# Patient Record
Sex: MALE | Race: BLACK OR AFRICAN AMERICAN | Employment: OTHER | URBAN - METROPOLITAN AREA
[De-identification: names, ages, dates, MRNs, and addresses within clinical notes are randomized per-mention and may not be internally consistent; named-entity substitution may affect disease eponyms.]

---

## 2017-01-23 RX ORDER — GLIPIZIDE 5 MG/1
TABLET ORAL
Qty: 270 TAB | Refills: 3 | Status: SHIPPED | OUTPATIENT
Start: 2017-01-23 | End: 2018-01-17 | Stop reason: SDUPTHER

## 2017-02-09 ENCOUNTER — TELEPHONE (OUTPATIENT)
Dept: ENDOCRINOLOGY | Age: 72
End: 2017-02-09

## 2017-02-09 NOTE — TELEPHONE ENCOUNTER
PA form was requested and I spoke with Maryuri Monahan.  He stated that it takes up to 24 hours to receive the form.

## 2017-02-09 NOTE — TELEPHONE ENCOUNTER
----- Message from Belkys Escudero sent at 2/9/2017  9:47 AM EST -----  Regarding: phone call  Patient's wife, Aida Loera, called to talk to you about needing a prior authorization for her 's Invokana. She can be reached at:  [phone]. She is on his disclosure form. Thanks Yue Khan.

## 2017-02-13 NOTE — TELEPHONE ENCOUNTER
Received PA form and completed and will fax back today. Notified pt over [employer] this was taken care of and we'll wait to hear the response.

## 2017-02-17 ENCOUNTER — TELEPHONE (OUTPATIENT)
Dept: ENDOCRINOLOGY | Age: 72
End: 2017-02-17

## 2017-02-17 NOTE — TELEPHONE ENCOUNTER
I spoke with Mrs. Merrick Donis and she stated that the Invokana was not going through at the pharmacy although it has been approved through [facility]. I informed her that I would call the pharmacy and find out the status. I called [facility] and spoke with Yg Quintana (pharmacist) and she stated that it is going through the insurance but the co-pay is $94.80. She stated that the coupon card will not go through because it stated that the plan limitations exceeds. It covers a 21-35 day supply.

## 2017-02-17 NOTE — TELEPHONE ENCOUNTER
----- Message from Layne Elias sent at 2/17/2017  9:44 AM EST -----  Regarding: Returning your call  Contact: [phone]  2/17/2017      Loyd Pearson. Jakub Kim at the  call center called in regards to Mrs. Ralph Licona returning your call from yesterday. Patient contact: [phone]. Thank you.   Dash Florentino

## 2017-02-21 RX ORDER — CANAGLIFLOZIN 300 MG/1
300 TABLET, FILM COATED ORAL DAILY
Qty: 30 TAB | Refills: 11 | Status: SHIPPED | OUTPATIENT
Start: 2017-02-21 | End: 2017-04-12 | Stop reason: SDUPTHER

## 2017-02-21 NOTE — TELEPHONE ENCOUNTER
I resubmitted the prescription for the 300 mg tablets to take one tab daily. Please find out if this will go through. Let the patient know that even though the bottle states to take one tab daily, he should continue taking 1/2 tab every other day.

## 2017-02-21 NOTE — TELEPHONE ENCOUNTER
I had received an e-mail from pt about this and I wrote back to him letting him know I rewrote his prescription for 100 mg tabs to take 1 tab daily. Please make sure this new prescription will go through with the co-pay card and notify pt once you find this out.

## 2017-02-22 NOTE — TELEPHONE ENCOUNTER
I spoke with Anil Montelongo (pharmacist) and he stated that the Invokana 300 mg was approved and with the coupon it is zero co-pay. I left a message for patient or his wife to call.

## 2017-02-23 ENCOUNTER — TELEPHONE (OUTPATIENT)
Dept: ENDOCRINOLOGY | Age: 72
End: 2017-02-23

## 2017-02-23 NOTE — TELEPHONE ENCOUNTER
Pt wife called and wanted clarity regarding the Invokana 100 mg tablets. She stated that the pharmacy informed her that they were waiting for a prior auth. I informed her as noted of the approval of the 300 mg tablets and the directions of 1/2 tablet every other day. The 100mg tablets were not approved by his insurance. She stated they did received the data for the approval of the 300 mg tablets and just wanted understanding.

## 2017-02-23 NOTE — TELEPHONE ENCOUNTER
----- Message from Basilio Odell sent at 2/23/2017  9:22 AM EST -----  Regarding: Patient call  Contact: [phone]  Patient wanted to send an [id number]. Stated that they received the medication and are very thankful for your help. Thanks.        Pablo Mendes

## 2017-04-12 ENCOUNTER — OFFICE VISIT (OUTPATIENT)
Dept: ENDOCRINOLOGY | Age: 72
End: 2017-04-12

## 2017-04-12 VITALS
BODY MASS INDEX: 29.21 KG/M2 | HEIGHT: 69 IN | HEART RATE: 62 BPM | WEIGHT: 197.2 LBS | DIASTOLIC BLOOD PRESSURE: 80 MMHG | SYSTOLIC BLOOD PRESSURE: 142 MMHG

## 2017-04-12 DIAGNOSIS — E11.9 TYPE 2 DIABETES MELLITUS WITHOUT COMPLICATION, WITHOUT LONG-TERM CURRENT USE OF INSULIN (HCC): Primary | ICD-10-CM

## 2017-04-12 DIAGNOSIS — I10 ESSENTIAL HYPERTENSION, BENIGN: ICD-10-CM

## 2017-04-12 DIAGNOSIS — E78.5 HYPERLIPIDEMIA LDL GOAL <100: ICD-10-CM

## 2017-04-12 RX ORDER — CANAGLIFLOZIN 300 MG/1
TABLET, FILM COATED ORAL
Qty: 30 TAB | Refills: 11
Start: 2017-04-12 | End: 2018-07-31 | Stop reason: SDUPTHER

## 2017-04-12 RX ORDER — ATORVASTATIN CALCIUM 20 MG/1
20 TABLET, FILM COATED ORAL DAILY
Qty: 90 TAB | Refills: 3
Start: 2017-04-12 | End: 2017-09-13 | Stop reason: SDUPTHER

## 2017-04-12 NOTE — PROGRESS NOTES
Chief Complaint   Patient presents with    Diabetes     pcp and pharmacy confirmed     History of Present Illness: Pedro Astorga is a 80 y.o. male here for follow up of diabetes. Weight up 2 lbs since last visit in 11/16. Has been taking 1/2 tab every other day of the invokana along with metformin and glipizide. Fasting sugars are under 130 when he is diligent with diet and exercise but when he's not they are in the 150-170 range with a few over 200. He has not been exercising regularly and is willing to get back into this. Has been checking his BP and readings are in the 140s frequently. Did not take his diovan this morning. Compliant with lipitor. Current Outpatient Prescriptions   Medication Sig    INVOKANA 300 mg tablet Take 1/2 tab every other day    glipiZIDE (GLUCOTROL) 5 mg tablet TAKE 1 TAB BEFORE BREAKFAST AND 2 TABS BEFORE DINNER    atorvastatin (LIPITOR) 20 mg tablet Take 1 Tab by mouth daily.  valsartan (DIOVAN) 160 mg tablet Take 1 Tab by mouth daily.  b complex vitamins tablet Take 1 Tab by mouth daily.  ONETOUCH ULTRA TEST strip TEST ONCE DAILY    metFORMIN ER (GLUCOPHAGE XR) 500 mg tablet TAKE 1 TAB THREE TIMES DAILY--Dose change 10/28/15    finasteride (PROSCAR) 5 mg tablet 1 Tab daily.  Lancets (ONE TOUCH ULTRASOFT LANCETS) Misc by Does Not Apply route. Check blood sugar as needed. No current facility-administered medications for this visit. Allergies   Allergen Reactions    Ace Inhibitors Cough    Cialis [Tadalafil] Other (comments)     Back pain    Onglyza [Saxagliptin] Other (comments)     headache     Review of Systems:  - Eyes: no blurry vision or double vision  - Cardiovascular: no chest pain  - Respiratory: no shortness of breath  - Musculoskeletal: no myalgias  - Neurological: no numbness/tingling in extremities    Physical Examination:  Blood pressure 142/80, pulse 62, height 5' 9\" (1.753 m), weight 197 lb 3.2 oz (89.4 kg).   - General: pleasant, no distress, good eye contact   - Neck: no carotid bruits  - Cardiovascular: regular, normal rate, nl s1 and s2, no m/r/g,   - Respiratory: clear bilaterally  - Integumentary: no edema,   - Psychiatric: normal mood and affect    Data Reviewed:   Component      Latest Ref Rng & Units 4/7/2017 4/7/2017 4/7/2017          11:01 AM 11:01 AM 11:01 AM   Glucose      65 - 99 mg/dL 151 (H)     BUN      8 - 27 mg/dL 12     Creatinine      0.76 - 1.27 mg/dL 0.88     GFR est non-AA      >59 mL/min/1.73 87     GFR est AA      >59 mL/min/1.73 101     BUN/Creatinine ratio      10 - 24 14     Sodium      134 - 144 mmol/L 143     Potassium      3.5 - 5.2 mmol/L 4.5     Chloride      96 - 106 mmol/L 106     CO2      18 - 29 mmol/L 22     Calcium      8.6 - 10.2 mg/dL 8.7     Protein, total      6.0 - 8.5 g/dL 6.9     Albumin      3.5 - 4.8 g/dL 4.4     GLOBULIN, TOTAL      1.5 - 4.5 g/dL 2.5     A-G Ratio      1.2 - 2.2 1.8     Bilirubin, total      0.0 - 1.2 mg/dL 0.5     Alk. phosphatase      39 - 117 IU/L 93     AST      0 - 40 IU/L 14     ALT      0 - 44 IU/L 15     Cholesterol, total      100 - 199 mg/dL  107    Triglyceride      0 - 149 mg/dL  84    HDL Cholesterol      >39 mg/dL  29 (L)    VLDL, calculated      5 - 40 mg/dL  17    LDL, calculated      0 - 99 mg/dL  61    Hemoglobin A1c, (calculated)      4.8 - 5.6 %   7.8 (H)   Estimated average glucose      mg/dL   177       Assessment/Plan:     1. DM w/ Complication: his most recent Hgb A1c was 7.8% in 4/17 up from 7.7% in 11/16 up from 7.2% in 6/16 up from 6.9% in 10/15 stable from 6/15 down from 8.9% in 2/15 up from 7.9% in 10/14 down from 8% in 6/14 up from 7.8% in 2/14 up from 7.4% in 10/13 down from 8.9% in June up from 8.7% in Feb 2013 up from 7.2% in Oct stable from July down from 7.6% in March 2012. A1c still above goal due to diet and exercise so will work on this first and if not at goal at next visit, likely will need to increase one of his meds below.   - cont metformin  mg 1 tab tid  - cont glipizide 5 mg in am and 10 mg before dinner  - cont invokana 300 mg 1/2 tab every other day  - check bs 1 time per day and focus on post-meal readings  - foot exam done 11/16  - optho UTD 5/16  - microalbumin nl 7/12, up to 49 in 6/13, down to 20 in 10/13, up to 44 in 10/14 so increased diovan to 160 mg daily but still 44 in 2/15. Down to 16 in 6/15 with better A1c but up to 39 in 10/15 and 47 in 6/16. Down to 31 in 11/16.    - check Hgb A1c and cmp and microalbumin prior to next visit      2. Unspecified essential hypertension: his BP was just above goal < 140/90 but will monitor home readings to see if a dose change is needed. - cont diovan 160 mg daily       3. Other and unspecified hyperlipidemia: Given DM, Goal LDL < 100, non-HDL < 130, and TG < 150. LDL 68 in Feb 2013 and 67 in 10/13 and 66 in 6/14 and 65 in 2/15 and 67 in 10/15 and 71 in 6/16 and 61 in 4/17 on 20 mg of lipitor. Will decrease to 10 mg.  - decrease lipitor to 1/2 of 20 mg daily  - check lipids prior to next visit        Patient Instructions   1) Your Hemoglobin A1c (3 month test of blood sugar) is 7.8% which is still above goal under 7%. You do have readings that show you can get your sugars closer to 130 or less in the morning when watching your diet more closely. 2) I will not make any changes to your regimen and want you to focus on brisk walking 5 times a week for at least 30 minutes. 3) Start monitoring blood pressure about 2-3 times per week at alternating times either in the morning or evening after resting for 5 minutes and sitting upright in a chair with your arm at heart level. Please let me know if you are having readings over 140 on the top number or 90 on the bottom number after 1 month as we may need to increase the valsartan. 4) Your liver and kidney and cholesterol are all at goal.    5) Decrease the atorvastatin to 1/2 tab at night.     Follow-up Disposition:  Return in about 5 months (around 9/12/2017).     Copy sent to:  Dr. Judd Villa via [employer] [facility]

## 2017-04-12 NOTE — PATIENT INSTRUCTIONS
1) Your Hemoglobin A1c (3 month test of blood sugar) is 7.8% which is still above goal under 7%. You do have readings that show you can get your sugars closer to 130 or less in the morning when watching your diet more closely. 2) I will not make any changes to your regimen and want you to focus on brisk walking 5 times a week for at least 30 minutes. 3) Start monitoring blood pressure about 2-3 times per week at alternating times either in the morning or evening after resting for 5 minutes and sitting upright in a chair with your arm at heart level. Please let me know if you are having readings over 140 on the top number or 90 on the bottom number after 1 month as we may need to increase the valsartan. 4) Your liver and kidney and cholesterol are all at goal.    5) Decrease the atorvastatin to 1/2 tab at night.

## 2017-04-12 NOTE — MR AVS SNAPSHOT
Visit Information Date & Time Provider Department Dept. Phone Encounter #  
 4/12/2017  9:50 AM Dali Soto MD [facility] [phone] Follow-up Instructions Return in about 5 months (around 9/12/2017). Upcoming Health Maintenance Date Due Hepatitis C Screening 1945 ZOSTER VACCINE AGE 60> 10/21/2006 MEDICARE YEARLY EXAM 10/21/2011 INFLUENZA AGE 9 TO ADULT 8/1/2016 EYE EXAM RETINAL OR DILATED Q1 5/9/2017 Pneumococcal 65+ Low/Medium Risk (2 of 2 - PPSV23) 7/19/2017 HEMOGLOBIN A1C Q6M 10/7/2017 MICROALBUMIN Q1 11/4/2017 FOOT EXAM Q1 11/9/2017 LIPID PANEL Q1 4/7/2018 GLAUCOMA SCREENING Q2Y 5/9/2018 COLONOSCOPY 11/4/2020 DTaP/Tdap/Td series (2 - Td) 3/30/2025 Allergies as of 4/12/2017  Review Complete On: 4/12/2017 By: Marnie Smith Severity Noted Reaction Type Reactions Ace Inhibitors  10/08/2009    Cough Cialis [Tadalafil]  10/08/2009    Other (comments) Back pain Onglyza [Saxagliptin]  03/20/2012    Other (comments)  
 headache Current Immunizations  Reviewed on 3/30/2015 Name Date Pneumococcal Vaccine (Unspecified Type) 7/19/2012 TD Vaccine 11/2/2005 Tdap 3/30/2015 11:13 AM  
  
 Not reviewed this visit You Were Diagnosed With   
  
 Codes Comments Type 2 diabetes mellitus without complication, without long-term current use of insulin (HCC)    -  Primary ICD-10-CM: E11.9 ICD-9-CM: 250.00 Hyperlipidemia LDL goal <100     ICD-10-CM: E78.5 ICD-9-CM: 272.4 Essential hypertension, benign     ICD-10-CM: I10 
ICD-9-CM: 401.1 Vitals BP Pulse Height(growth percentile) Weight(growth percentile) BMI Smoking Status 142/80 62 5' 9\" (1.753 m) 197 lb 3.2 oz (89.4 kg) 29.12 kg/m2 Never Smoker Vitals History BMI and BSA Data Body Mass Index Body Surface Area  
 29.12 kg/m 2 2.09 m 2 Preferred Pharmacy Pharmacy Name Phone [id number] [id number] [phone] Your Updated Medication List  
  
   
This list is accurate as of: 4/12/17 11:13 AM.  Always use your most recent med list.  
  
  
  
  
 atorvastatin 20 mg tablet Commonly known as:  LIPITOR Take 1 Tab by mouth daily. Dose change 4/12/17--updated med list--did not send prescription to the pharmacy  
  
 b complex vitamins tablet Take 1 Tab by mouth daily. finasteride 5 mg tablet Commonly known as:  PROSCAR  
1 Tab daily. glipiZIDE 5 mg tablet Commonly known as:  GLUCOTROL  
TAKE 1 TAB BEFORE BREAKFAST AND 2 TABS BEFORE DINNER INVOKANA 300 mg tablet Generic drug:  canagliflozin Take 1/2 tab every other day Lancets Misc Commonly known as:  ONETOUCH ULTRASOFT LANCETS  
by Does Not Apply route. Check blood sugar as needed. metFORMIN  mg tablet Commonly known as:  GLUCOPHAGE XR  
TAKE 1 TAB THREE TIMES DAILY--Dose change 10/28/15 ONETOUCH ULTRA TEST strip Generic drug:  glucose blood VI test strips TEST ONCE DAILY  
  
 valsartan 160 mg tablet Commonly known as:  DIOVAN Take 1 Tab by mouth daily. We Performed the Following HEMOGLOBIN A1C WITH EAG [14891 CPT(R)] LIPID PANEL [23561 CPT(R)] METABOLIC PANEL, COMPREHENSIVE [46630 CPT(R)] MICROALBUMIN, UR, RAND W/ MICROALBUMIN/CREA RATIO [id number] CPT(R)] Follow-up Instructions Return in about 5 months (around 9/12/2017). Patient Instructions 1) Your Hemoglobin A1c (3 month test of blood sugar) is 7.8% which is still above goal under 7%. You do have readings that show you can get your sugars closer to 130 or less in the morning when watching your diet more closely. 2) I will not make any changes to your regimen and want you to focus on brisk walking 5 times a week for at least 30 minutes. 3) Start monitoring blood pressure about 2-3 times per week at alternating times either in the morning or evening after resting for 5 minutes and sitting upright in a chair with your arm at heart level. Please let me know if you are having readings over 140 on the top number or 90 on the bottom number after 1 month as we may need to increase the valsartan. 4) Your liver and kidney and cholesterol are all at goal. 
 
5) Decrease the atorvastatin to 1/2 tab at night. Introducing [facility]! Dear Roverto Littlejohn: Thank you for requesting a [employer] account. Our records indicate that you already have an active [employer] account. You can access your account anytime at [email or web address] Did you know that you can access your hospital and ER discharge instructions at any time in [employer]? You can also review all of your test results from your hospital stay or ER visit. Additional Information If you have questions, please visit the Frequently Asked Questions section of the [employer] website at [email or web address] Remember, [employer] is NOT to be used for urgent needs. For medical emergencies, dial 911. Now available from your iPhone and Android! Please provide this summary of care documentation to your next provider. Your primary care clinician is listed as STEPHANIE ROBERTS. If you have any questions after today's visit, please call [phone].

## 2017-06-05 RX ORDER — METFORMIN HYDROCHLORIDE 500 MG/1
TABLET, EXTENDED RELEASE ORAL
Qty: 360 TAB | Refills: 3 | OUTPATIENT
Start: 2017-06-05

## 2017-06-05 RX ORDER — METFORMIN HYDROCHLORIDE 500 MG/1
TABLET, EXTENDED RELEASE ORAL
Qty: 270 TAB | Refills: 3 | Status: SHIPPED | OUTPATIENT
Start: 2017-06-05 | End: 2018-05-31 | Stop reason: SDUPTHER

## 2017-07-05 ENCOUNTER — HOSPITAL ENCOUNTER (OUTPATIENT)
Dept: VASCULAR SURGERY | Age: 72
Discharge: HOME | End: 2017-07-05
Payer: MEDICARE

## 2017-07-05 DIAGNOSIS — M79.662 PAIN IN LEFT LOWER LEG: ICD-10-CM

## 2017-07-05 PROCEDURE — 93971 EXTREMITY STUDY: CPT

## 2017-07-27 RX ORDER — BLOOD SUGAR DIAGNOSTIC
STRIP MISCELLANEOUS
Qty: 50 STRIP | Refills: 11 | Status: SHIPPED | OUTPATIENT
Start: 2017-07-27 | End: 2018-09-04 | Stop reason: SDUPTHER

## 2017-09-05 DIAGNOSIS — Z12.5 PROSTATE CANCER SCREENING: Primary | ICD-10-CM

## 2017-09-05 DIAGNOSIS — Z79.899 ENCOUNTER FOR LONG-TERM (CURRENT) USE OF OTHER MEDICATIONS: ICD-10-CM

## 2017-09-09 LAB
ALBUMIN SERPL-MCNC: 4.5 G/DL (ref 3.5–4.8)
ALBUMIN/CREAT UR: 42.5 MG/G CREAT (ref 0–30)
ALBUMIN/GLOB SERPL: 1.6 {RATIO} (ref 1.2–2.2)
ALP SERPL-CCNC: 102 IU/L (ref 39–117)
ALT SERPL-CCNC: 14 IU/L (ref 0–44)
AST SERPL-CCNC: 12 IU/L (ref 0–40)
BILIRUB SERPL-MCNC: 0.6 MG/DL (ref 0–1.2)
BUN SERPL-MCNC: 11 MG/DL (ref 8–27)
BUN/CREAT SERPL: 13 (ref 10–24)
CALCIUM SERPL-MCNC: 9.3 MG/DL (ref 8.6–10.2)
CHLORIDE SERPL-SCNC: 105 MMOL/L (ref 96–106)
CHOLEST SERPL-MCNC: 118 MG/DL (ref 100–199)
CO2 SERPL-SCNC: 19 MMOL/L (ref 18–29)
CREAT SERPL-MCNC: 0.86 MG/DL (ref 0.76–1.27)
CREAT UR-MCNC: 55.7 MG/DL
EST. AVERAGE GLUCOSE BLD GHB EST-MCNC: 163 MG/DL
GLOBULIN SER CALC-MCNC: 2.8 G/DL (ref 1.5–4.5)
GLUCOSE SERPL-MCNC: 150 MG/DL (ref 65–99)
HBA1C MFR BLD: 7.3 % (ref 4.8–5.6)
HDLC SERPL-MCNC: 29 MG/DL
LDLC SERPL CALC-MCNC: 69 MG/DL (ref 0–99)
MICROALBUMIN UR-MCNC: 23.7 UG/ML
POTASSIUM SERPL-SCNC: 4.4 MMOL/L (ref 3.5–5.2)
PROT SERPL-MCNC: 7.3 G/DL (ref 6–8.5)
PSA SERPL-MCNC: 1.2 NG/ML (ref 0–4)
SODIUM SERPL-SCNC: 143 MMOL/L (ref 134–144)
TRIGL SERPL-MCNC: 98 MG/DL (ref 0–149)
TSH SERPL DL<=0.005 MIU/L-ACNC: 1.81 UIU/ML (ref 0.45–4.5)
VLDLC SERPL CALC-MCNC: 20 MG/DL (ref 5–40)

## 2017-09-13 ENCOUNTER — OFFICE VISIT (OUTPATIENT)
Dept: ENDOCRINOLOGY | Age: 72
End: 2017-09-13

## 2017-09-13 VITALS
WEIGHT: 193.2 LBS | HEART RATE: 76 BPM | HEIGHT: 69 IN | DIASTOLIC BLOOD PRESSURE: 87 MMHG | SYSTOLIC BLOOD PRESSURE: 165 MMHG | BODY MASS INDEX: 28.61 KG/M2

## 2017-09-13 DIAGNOSIS — E11.9 TYPE 2 DIABETES MELLITUS WITHOUT COMPLICATION, WITHOUT LONG-TERM CURRENT USE OF INSULIN (HCC): Primary | ICD-10-CM

## 2017-09-13 DIAGNOSIS — I10 ESSENTIAL HYPERTENSION, BENIGN: ICD-10-CM

## 2017-09-13 DIAGNOSIS — E78.5 HYPERLIPIDEMIA LDL GOAL <100: ICD-10-CM

## 2017-09-13 RX ORDER — ATORVASTATIN CALCIUM 20 MG/1
10 TABLET, FILM COATED ORAL DAILY
Qty: 90 TAB | Refills: 3
Start: 2017-09-13 | End: 2018-03-14 | Stop reason: SDUPTHER

## 2017-09-13 RX ORDER — VALSARTAN 320 MG/1
TABLET ORAL
Qty: 90 TAB | Refills: 3 | Status: SHIPPED | OUTPATIENT
Start: 2017-09-13 | End: 2018-07-25 | Stop reason: RX

## 2017-09-13 NOTE — PATIENT INSTRUCTIONS
1) Your Hemoglobin A1c (3 month test of blood sugar) was 7.3% which is the best it's been in a year and your weight is down 4 lbs. Keep up the good work. 2) Your blood pressure is above goal of 140/90 or less. We will increase the valsartan to 320 mg daily. Take 2 of the 160 mg tabs until these run out and then take 1 of the 320 mg tabs. 3) Your LDL (bad cholesterol) is at goal even on 1/2 tab of atorvastatin so keep taking this dose. If you want to change to the 10 mg tabs in the future, let me know. 4) Microalbumin/creatinine ratio is a marker of the amount of protein in your urine. Goal is less than 30. Your value is slightly abnormal at 42. This indicates that your kidneys are being slightly affected by your uncontrolled diabetes and/or blood pressure. Continue to take valsartan at the higher dose to help protect your kidneys from the effects of diabetes and high blood pressure. 5) If you want to try claritin or allegra or zyrtec at bedtime to see if this helps with any cough you can do so.

## 2017-09-13 NOTE — PROGRESS NOTES
Chief Complaint   Patient presents with    Diabetes     pcp and pharmacy confirmed     History of Present Illness: Yosef Rendon is a 80 y.o. male here for follow up of diabetes. Weight down 4 lbs since last visit in 4/17. This summer was having more pain in the left leg and did see higher sugars in the upper 200s when this was happening. Then the pain went away on its own and his sugars came back down. Has been doing casie-chi 2x/week and then does the treadmill for about 36 minutes which his about 2 miles. Fasting sugar was 201 this morning but had several carb servings for dinner last night. Usually it runs in the 130-150s. Compliant with DM meds as below. Has been taking 1/2 tab of atorvastatin daily. Has had some cough with mucus production and spends a lot of time outside so recommended a trial of anti-histamine to see if this helps. Home BP readings are over 140 regularly and was 165 at home today and had a mild headache despite compliance with diovan. Current Outpatient Prescriptions   Medication Sig    atorvastatin (LIPITOR) 20 mg tablet Take 0.5 Tabs by mouth daily. Dose change 4/12/17--updated med list--did not send prescription to the pharmacy    ONETOUCH ULTRA TEST strip TEST ONCE DAILY    metFORMIN ER (GLUCOPHAGE XR) 500 mg tablet TAKE 1 TAB THREE TIMES DAILY    INVOKANA 300 mg tablet Take 1/2 tab every other day    glipiZIDE (GLUCOTROL) 5 mg tablet TAKE 1 TAB BEFORE BREAKFAST AND 2 TABS BEFORE DINNER    valsartan (DIOVAN) 160 mg tablet Take 1 Tab by mouth daily.  b complex vitamins tablet Take 1 Tab by mouth daily.  Lancets (ONE TOUCH ULTRASOFT LANCETS) Misc by Does Not Apply route. Check blood sugar as needed. No current facility-administered medications for this visit.       Allergies   Allergen Reactions    Ace Inhibitors Cough    Cialis [Tadalafil] Other (comments)     Back pain    Onglyza [Saxagliptin] Other (comments)     headache     Review of Systems:  - Eyes: no blurry vision or double vision  - Cardiovascular: no chest pain  - Respiratory: no shortness of breath  - Musculoskeletal: no myalgias  - Neurological: no numbness/tingling in extremities    Physical Examination:  Blood pressure 165/87, pulse 76, height 5' 9\" (1.753 m), weight 193 lb 3.2 oz (87.6 kg). - General: pleasant, no distress, good eye contact   - Neck: no carotid bruits  - Cardiovascular: regular, normal rate, nl s1 and s2, no m/r/g,   - Respiratory: clear bilaterally  - Integumentary: no edema,   - Psychiatric: normal mood and affect    Data Reviewed:   Component      Latest Ref Rng & Units 9/8/2017 9/8/2017 9/8/2017 9/8/2017          10:54 AM 10:54 AM 10:54 AM 10:54 AM   Glucose      65 - 99 mg/dL  150 (H)     BUN      8 - 27 mg/dL  11     Creatinine      0.76 - 1.27 mg/dL  0.86     GFR est non-AA      >59 mL/min/1.73  88     GFR est AA      >59 mL/min/1.73  102     BUN/Creatinine ratio      10 - 24  13     Sodium      134 - 144 mmol/L  143     Potassium      3.5 - 5.2 mmol/L  4.4     Chloride      96 - 106 mmol/L  105     CO2      18 - 29 mmol/L  19     Calcium      8.6 - 10.2 mg/dL  9.3     Protein, total      6.0 - 8.5 g/dL  7.3     Albumin      3.5 - 4.8 g/dL  4.5     GLOBULIN, TOTAL      1.5 - 4.5 g/dL  2.8     A-G Ratio      1.2 - 2.2  1.6     Bilirubin, total      0.0 - 1.2 mg/dL  0.6     Alk. phosphatase      39 - 117 IU/L  102     AST      0 - 40 IU/L  12     ALT (SGPT)      0 - 44 IU/L  14     Cholesterol, total      100 - 199 mg/dL   118    Triglyceride      0 - 149 mg/dL   98    HDL Cholesterol      >39 mg/dL   29 (L)    VLDL, calculated      5 - 40 mg/dL   20    LDL, calculated      0 - 99 mg/dL   69    Creatinine, urine      Not Estab. mg/dL 55.7      Microalbumin, urine      Not Estab. ug/mL 23.7      Microalbumin/Creat.  Ratio      0.0 - 30.0 mg/g creat 42.5 (H)      Hemoglobin A1c, (calculated)      4.8 - 5.6 %    7.3 (H)   Estimated average glucose      mg/dL    163 Assessment/Plan:     1. DM w/ Complication: his most recent Hgb A1c was 7.3% in 9/17 down from 7.8% in 4/17 up from 7.7% in 11/16 up from 7.2% in 6/16 up from 6.9% in 10/15 stable from 6/15 down from 8.9% in 2/15 up from 7.9% in 10/14 down from 8% in 6/14 up from 7.8% in 2/14 up from 7.4% in 10/13 down from 8.9% in June up from 8.7% in Feb 2013 up from 7.2% in Oct stable from July down from 7.6% in March 2012. A1c is the best it's been in a year so will keep his doses the same. - cont metformin  mg 1 tab tid  - cont glipizide 5 mg in am and 10 mg before dinner  - cont invokana 300 mg 1/2 tab every other day  - check bs 1 time per day and focus on post-meal readings  - foot exam done 11/16  - optho UTD 5/17  - microalbumin nl 7/12, up to 49 in 6/13, down to 20 in 10/13, up to 44 in 10/14 so increased diovan to 160 mg daily but still 44 in 2/15. Down to 16 in 6/15 with better A1c but up to 39 in 10/15 and 47 in 6/16. Down to 32 in 11/16. Up to 42 in 9/17 so will increase diovan to 320.  - check Hgb A1c and bmp and microalbumin prior to next visit      2. Unspecified essential hypertension: his BP was above goal < 140/90 so will increase diovan  - increase diovan to 320 mg daily       3. Other and unspecified hyperlipidemia: Given DM, Goal LDL < 100, non-HDL < 130, and TG < 150. LDL 68 in Feb 2013 and 67 in 10/13 and 66 in 6/14 and 65 in 2/15 and 67 in 10/15 and 71 in 6/16 and 61 in 4/17 on 20 mg of lipitor. I decreased to 10 mg and LDL still 69 in 9/17 so will stay on this dose. - cont lipitor 1/2 of 20 mg daily  - check lipids in 9/18        Patient Instructions   1) Your Hemoglobin A1c (3 month test of blood sugar) was 7.3% which is the best it's been in a year and your weight is down 4 lbs. Keep up the good work. 2) Your blood pressure is above goal of 140/90 or less. We will increase the valsartan to 320 mg daily.   Take 2 of the 160 mg tabs until these run out and then take 1 of the 320 mg tabs. 3) Your LDL (bad cholesterol) is at goal even on 1/2 tab of atorvastatin so keep taking this dose. If you want to change to the 10 mg tabs in the future, let me know. 4) Microalbumin/creatinine ratio is a marker of the amount of protein in your urine. Goal is less than 30. Your value is slightly abnormal at 42. This indicates that your kidneys are being slightly affected by your uncontrolled diabetes and/or blood pressure. Continue to take valsartan at the higher dose to help protect your kidneys from the effects of diabetes and high blood pressure. 5) If you want to try claritin or allegra or zyrtec at bedtime to see if this helps with any cough you can do so. Follow-up Disposition:  Return in about 6 months (around 3/13/2018).     Copy sent to:  Dr. Willy Arizmendi via [facility]

## 2017-09-13 NOTE — MR AVS SNAPSHOT
Visit Information Date & Time Provider Department Dept. Phone Encounter #  
 9/13/2017 10:30 AM Blaine Ramsay, [address] [facility] Diabetes and Endocrinology [phone] [id number] Follow-up Instructions Return in about 6 months (around 3/13/2018). Your Appointments 9/27/2017 10:00 AM  
COMPLETE PHYSICAL with Cailin Marino MD  
[facility] Internal Medicine [facility]) Appt Note: cpe  
 [address] [facility] [address]  
[address] [address] [address] Upcoming Health Maintenance Date Due Hepatitis C Screening 1945 ZOSTER VACCINE AGE 60> 8/21/2006 MEDICARE YEARLY EXAM 10/21/2011 Pneumococcal 65+ Low/Medium Risk (2 of 2 - PPSV23) 7/19/2017 INFLUENZA AGE 9 TO ADULT 8/1/2017 FOOT EXAM Q1 11/9/2017 HEMOGLOBIN A1C Q6M 3/8/2018 EYE EXAM RETINAL OR DILATED Q1 5/9/2018 MICROALBUMIN Q1 9/8/2018 LIPID PANEL Q1 9/8/2018 GLAUCOMA SCREENING Q2Y 5/9/2019 COLONOSCOPY 11/4/2020 DTaP/Tdap/Td series (2 - Td) 3/30/2025 Allergies as of 9/13/2017  Review Complete On: 9/13/2017 By: Blaine Ramsay MD  
  
 Severity Noted Reaction Type Reactions Ace Inhibitors  10/08/2009    Cough Cialis [Tadalafil]  10/08/2009    Other (comments) Back pain Onglyza [Saxagliptin]  03/20/2012    Other (comments)  
 headache Current Immunizations  Reviewed on 3/30/2015 Name Date  
 TD Vaccine 11/2/2005 Tdap 3/30/2015 11:13 AM  
 ZZZ-RETIRED (DO NOT USE) Pneumococcal Vaccine (Unspecified Type) 7/19/2012 Not reviewed this visit You Were Diagnosed With   
  
 Codes Comments Type 2 diabetes mellitus without complication, without long-term current use of insulin (HCC)    -  Primary ICD-10-CM: E11.9 ICD-9-CM: 250.00 Hyperlipidemia LDL goal <100     ICD-10-CM: E78.5 ICD-9-CM: 272.4 Essential hypertension, benign     ICD-10-CM: I10 
ICD-9-CM: 401.1 Vitals BP Pulse Height(growth percentile) Weight(growth percentile) BMI Smoking Status 165/87 76 5' 9\" (1.753 m) 193 lb 3.2 oz (87.6 kg) 28.53 kg/m2 Never Smoker Vitals History BMI and BSA Data Body Mass Index Body Surface Area 28.53 kg/m 2 2.07 m 2 Preferred Pharmacy Pharmacy Name Phone  [address] [phone] Your Updated Medication List  
  
   
This list is accurate as of: 9/13/17 11:33 AM.  Always use your most recent med list.  
  
  
  
  
 atorvastatin 20 mg tablet Commonly known as:  LIPITOR Take 0.5 Tabs by mouth daily. Dose change 4/12/17--updated med list--did not send prescription to the pharmacy  
  
 b complex vitamins tablet Take 1 Tab by mouth daily. glipiZIDE 5 mg tablet Commonly known as:  GLUCOTROL  
TAKE 1 TAB BEFORE BREAKFAST AND 2 TABS BEFORE DINNER INVOKANA 300 mg tablet Generic drug:  canagliflozin Take 1/2 tab every other day Lancets Misc Commonly known as:  ONETOUCH ULTRASOFT LANCETS  
by Does Not Apply route. Check blood sugar as needed. metFORMIN  mg tablet Commonly known as:  GLUCOPHAGE XR  
TAKE 1 TAB THREE TIMES DAILY  
  
 ONETOUCH ULTRA TEST strip Generic drug:  glucose blood VI test strips TEST ONCE DAILY  
  
 valsartan 320 mg tablet Commonly known as:  DIOVAN Take 1 tab daily. For blood pressure and kidney protection Prescriptions Sent to Pharmacy Refills  
 valsartan (DIOVAN) 320 mg tablet 3 Sig: Take 1 tab daily. For blood pressure and kidney protection Class: Normal  
 Pharmacy: [facility] [address] Ph #: [phone] We Performed the Following HEMOGLOBIN A1C WITH EAG [15153 CPT(R)] METABOLIC PANEL, BASIC [24460 CPT(R)] MICROALBUMIN, UR, RAND W/ MICROALBUMIN/CREA RATIO [id number] CPT(R)] Follow-up Instructions Return in about 6 months (around 3/13/2018). Patient Instructions 1) Your Hemoglobin A1c (3 month test of blood sugar) was 7.3% which is the best it's been in a year and your weight is down 4 lbs. Keep up the good work. 2) Your blood pressure is above goal of 140/90 or less. We will increase the valsartan to 320 mg daily. Take 2 of the 160 mg tabs until these run out and then take 1 of the 320 mg tabs. 3) Your LDL (bad cholesterol) is at goal even on 1/2 tab of atorvastatin so keep taking this dose. If you want to change to the 10 mg tabs in the future, let me know. 4) Microalbumin/creatinine ratio is a marker of the amount of protein in your urine. Goal is less than 30. Your value is slightly abnormal at 42. This indicates that your kidneys are being slightly affected by your uncontrolled diabetes and/or blood pressure. Continue to take valsartan at the higher dose to help protect your kidneys from the effects of diabetes and high blood pressure. 5) If you want to try claritin or allegra or zyrtec at bedtime to see if this helps with any cough you can do so. Introducing [facility] & HEALTH SERVICES! Dear Eyad Stein: Thank you for requesting a [employer] account. Our records indicate that you already have an active [employer] account. You can access your account anytime at [email or web address]. [email or web address] Did you know that you can access your hospital and ER discharge instructions at any time in [employer]? You can also review all of your test results from your hospital stay or ER visit. Additional Information If you have questions, please visit the Frequently Asked Questions section of the [employer] website at [email or web address] Remember, [employer] is NOT to be used for urgent needs. For medical emergencies, dial 911. Now available from your iPhone and Android! Please provide this summary of care documentation to your next provider. Your primary care clinician is listed as STEPHANIE ROBERTS. If you have any questions after today's visit, please call [phone].

## 2017-09-27 ENCOUNTER — OFFICE VISIT (OUTPATIENT)
Dept: INTERNAL MEDICINE CLINIC | Age: 72
End: 2017-09-27

## 2017-09-27 VITALS
HEIGHT: 68 IN | DIASTOLIC BLOOD PRESSURE: 80 MMHG | TEMPERATURE: 97.9 F | BODY MASS INDEX: 29.61 KG/M2 | OXYGEN SATURATION: 95 % | WEIGHT: 195.4 LBS | RESPIRATION RATE: 18 BRPM | SYSTOLIC BLOOD PRESSURE: 130 MMHG | HEART RATE: 79 BPM

## 2017-09-27 DIAGNOSIS — Z12.11 SCREEN FOR COLON CANCER: ICD-10-CM

## 2017-09-27 DIAGNOSIS — Z71.89 ADVANCE DIRECTIVE DISCUSSED WITH PATIENT: ICD-10-CM

## 2017-09-27 DIAGNOSIS — R05.8 ALLERGIC COUGH: ICD-10-CM

## 2017-09-27 DIAGNOSIS — Z11.59 NEED FOR HEPATITIS C SCREENING TEST: ICD-10-CM

## 2017-09-27 DIAGNOSIS — Z00.00 ROUTINE GENERAL MEDICAL EXAMINATION AT A HEALTH CARE FACILITY: Primary | ICD-10-CM

## 2017-09-27 RX ORDER — LORATADINE 10 MG/1
10 TABLET ORAL DAILY
Qty: 30 TAB | Refills: 11
Start: 2017-09-27 | End: 2018-03-14

## 2017-09-27 NOTE — MR AVS SNAPSHOT
Visit Information Date & Time Provider Department Dept. Phone Encounter #  
 9/27/2017 10:00 AM Kristian Lopez, [address] [facility] Internal Medicine [phone] [id number] Follow-up Instructions Return in about 1 year (around 9/27/2018). Your Appointments 3/14/2018 10:30 AM  
ROUTINE CARE with MD Juan Valadez Diabetes and Endocrinology [facility]) Appt Note: f/u diabetes cp0.00  
 [address]  [address]  
[address] [facility] [address] [address] Upcoming Health Maintenance Date Due Hepatitis C Screening 1945 FOOT EXAM Q1 11/9/2017 HEMOGLOBIN A1C Q6M 3/8/2018 EYE EXAM RETINAL OR DILATED Q1 5/9/2018 MICROALBUMIN Q1 9/8/2018 LIPID PANEL Q1 9/8/2018 MEDICARE YEARLY EXAM 9/28/2018 GLAUCOMA SCREENING Q2Y 5/9/2019 COLONOSCOPY 11/4/2020 DTaP/Tdap/Td series (2 - Td) 3/30/2025 Allergies as of 9/27/2017  Review Complete On: 9/27/2017 By: Kristian Lopez MD  
  
 Severity Noted Reaction Type Reactions Ace Inhibitors  10/08/2009    Cough Cialis [Tadalafil]  10/08/2009    Other (comments) Back pain Onglyza [Saxagliptin]  03/20/2012    Other (comments)  
 headache Current Immunizations  Reviewed on 3/30/2015 Name Date  
 TD Vaccine 11/2/2005 Tdap 3/30/2015 11:13 AM  
 ZZZ-RETIRED (DO NOT USE) Pneumococcal Vaccine (Unspecified Type) 7/19/2012 Not reviewed this visit You Were Diagnosed With   
  
 Codes Comments Routine general medical examination at a health care facility    -  Primary ICD-10-CM: Z00.00 ICD-9-CM: V70.0 Need for hepatitis C screening test     ICD-10-CM: Z11.59 
ICD-9-CM: V73.89 Advance directive discussed with patient     ICD-10-CM: Z71.89 ICD-9-CM: V65.49 Allergic cough     ICD-10-CM: R05 ICD-9-CM: 786.2 Screen for colon cancer     ICD-10-CM: Z12.11 ICD-9-CM: V76.51 Vitals BP Pulse Temp Resp Height(growth percentile) Weight(growth percentile) 130/80 79 97.9 °F (36.6 °C) (Oral) 18 5' 8\" (1.727 m) 195 lb 6.4 oz (88.6 kg) SpO2 BMI Smoking Status 95% 29.71 kg/m2 Never Smoker Vitals History BMI and BSA Data Body Mass Index Body Surface Area  
 29.71 kg/m 2 2.06 m 2 Preferred Pharmacy Pharmacy Name Phone  [address] [phone] Your Updated Medication List  
  
   
This list is accurate as of: 9/27/17 11:12 AM.  Always use your most recent med list.  
  
  
  
  
 atorvastatin 20 mg tablet Commonly known as:  LIPITOR Take 0.5 Tabs by mouth daily. Dose change 4/12/17--updated med list--did not send prescription to the pharmacy  
  
 b complex vitamins tablet Take 1 Tab by mouth daily. glipiZIDE 5 mg tablet Commonly known as:  GLUCOTROL  
TAKE 1 TAB BEFORE BREAKFAST AND 2 TABS BEFORE DINNER INVOKANA 300 mg tablet Generic drug:  canagliflozin Take 1/2 tab every other day Lancets Misc Commonly known as:  ONETOUCH ULTRASOFT LANCETS  
by Does Not Apply route. Check blood sugar as needed. loratadine 10 mg tablet Commonly known as:  Reyes Kam Take 1 Tab by mouth daily. metFORMIN  mg tablet Commonly known as:  GLUCOPHAGE XR  
TAKE 1 TAB THREE TIMES DAILY  
  
 ONETOUCH ULTRA TEST strip Generic drug:  glucose blood VI test strips TEST ONCE DAILY  
  
 valsartan 320 mg tablet Commonly known as:  DIOVAN Take 1 tab daily. For blood pressure and kidney protection We Performed the Following CBC WITH AUTOMATED DIFF [13927 CPT(R)] HEPATITIS C AB [00676 CPT(R)] OCCULT BLOOD, IMMUNOASSAY (FIT) [id number] CPT(R)] PSA SCREENING (SCREENING) [ [facility]] Follow-up Instructions Return in about 1 year (around 9/27/2018). Patient Instructions As discussed in your appointment today, Advance Care Planning is an important part of planning for your healthcare future. Discussing your preferences with your family and your care team is a part of good healthcare so that we can be guided by your known values and goals. Our office offers this service at no cost to you. Our Nurse Navigators and certified [employer] ® Facilitators, Damien Chan and Valentino Palma typically schedule family appointments for this service on Wednesdays. To schedule an Advance Care Planning visit or to receive more information about this service, please call [facility] Internal Medicine at [phone] and ask to speak directly to Jovan Babcock or [employer]. Introducing [facility] & HEALTH SERVICES! Dear Yovany Farooq: Thank you for requesting a [employer] account. Our records indicate that you already have an active [employer] account. You can access your account anytime at [email or web address]. [email or web address] Did you know that you can access your hospital and ER discharge instructions at any time in [employer]? You can also review all of your test results from your hospital stay or ER visit. Additional Information If you have questions, please visit the Frequently Asked Questions section of the [employer] website at [email or web address] Remember, [employer] is NOT to be used for urgent needs. For medical emergencies, dial 911. Now available from your iPhone and Android! Please provide this summary of care documentation to your next provider. Your primary care clinician is listed as STEPHANIE ROBERTS. If you have any questions after today's visit, please call [phone].

## 2017-09-27 NOTE — PATIENT INSTRUCTIONS
As discussed in your appointment today, [address] is an important part of planning for your healthcare future. Discussing your preferences with your family and your care team is a part of good healthcare so that we can be guided by your known values and goals. Our office offers this service at no cost to you. Our Nurse Navigators and certified [employer]ing Cho[employer] ® Facilitators, Marquis Lyles and Nell Bailon typically schedule family appointments for this service on Wednesdays. To schedule an Advance Care Planning visit or to receive more information about this service, please call [facility] Internal Medicine at [phone] and ask to speak directly to Hema Meza or Group [employer].

## 2017-09-27 NOTE — PROGRESS NOTES
HISTORY OF PRESENT ILLNESS  Patricia Khoury is a 80 y.o. male. [facility] Raffi Littlejohn is seen today for a complete physical examination and follow up of chronic problems. Preventive medicine. Fully reviewed today. He is due for a complete physical examination and select laboratory studies and due for colorectal cancer screening with stool OB for which a kit is provided. He is up to date with a colonoscopy and vaccinations that he agrees to. Chronic medical problems are reviewed. Up to date with endocrinology for diabetes and thyroid. Memory screening was undertaken and he scored a 27/30 on his Mini Mental Status Examination. He had some left calf pain a few weeks ago but this resolved. He has had some cough for about 8 months, it worsens when he lays flat at night and he does have mucus. I advised a trial of Claritin. He will let me know if symptoms persist.    [employer]/gwo     We counseled regarding healthy lifestyle issues including diet, exercise and stress management. Family history, social history, etc. Are reviewed and updated, see electronic record. Review of Systems   Constitutional: Negative for weight loss. Respiratory: Positive for cough and sputum production. Cardiovascular: Negative for chest pain, palpitations, leg swelling and PND. Gastrointestinal: Negative. Genitourinary: Negative. Musculoskeletal: Negative for myalgias. Neurological: Negative for focal weakness. Physical Exam   Constitutional: He is oriented to person, place, and time. He appears well-developed and well-nourished. No distress. HENT:   Head: Normocephalic and atraumatic. Right Ear: Tympanic membrane, external ear and ear canal normal.   Left Ear: Tympanic membrane, external ear and ear canal normal.   Eyes: EOM are normal. Pupils are equal, round, and reactive to light. Right eye exhibits no discharge. Left eye exhibits no discharge. Neck: Normal range of motion. Neck supple.  Carotid bruit is not present. No thyromegaly present. Cardiovascular: Normal rate, regular rhythm, normal heart sounds and intact distal pulses. Exam reveals no gallop and no friction rub. No murmur heard. Pulmonary/Chest: Effort normal and breath sounds normal. No respiratory distress. He has no wheezes. He has no rales. Abdominal: Soft. Bowel sounds are normal. He exhibits no distension and no mass. There is no tenderness. There is no rebound and no guarding. Genitourinary: Rectum normal. Rectal exam shows no mass and no tenderness. Prostate is enlarged. Prostate is not tender. Musculoskeletal: Normal range of motion. He exhibits no edema or tenderness. Lymphadenopathy:     He has no cervical adenopathy. Neurological: He is alert and oriented to person, place, and time. He has normal reflexes. Skin: Skin is warm and dry. No rash noted. Psychiatric: He has a normal mood and affect. His behavior is normal.   Nursing note and vitals reviewed. ASSESSMENT and PLAN  Diagnoses and all orders for this visit:    1. Routine general medical examination at a health care facility  -     PSA SCREENING (SCREENING)  -     CBC WITH AUTOMATED DIFF    2. Need for hepatitis C screening test  -     HEPATITIS C AB    3. Advance directive discussed with patient    4. Allergic cough  -     loratadine (CLARITIN) 10 mg tablet; Take 1 Tab by mouth daily.     5. Screen for colon cancer  -     OCCULT BLOOD, IMMUNOASSAY (FIT)

## 2017-09-28 LAB
BASOPHILS # BLD AUTO: 0 X10E3/UL (ref 0–0.2)
BASOPHILS NFR BLD AUTO: 1 %
EOSINOPHIL # BLD AUTO: 0.1 X10E3/UL (ref 0–0.4)
EOSINOPHIL NFR BLD AUTO: 2 %
ERYTHROCYTE [DISTWIDTH] IN BLOOD BY AUTOMATED COUNT: 14.8 % (ref 12.3–15.4)
HCT VFR BLD AUTO: 45.3 % (ref 37.5–51)
HCV AB S/CO SERPL IA: <0.1 S/CO RATIO (ref 0–0.9)
HGB BLD-MCNC: 15.2 G/DL (ref 12.6–17.7)
IMM GRANULOCYTES # BLD: 0 X10E3/UL (ref 0–0.1)
IMM GRANULOCYTES NFR BLD: 0 %
LYMPHOCYTES # BLD AUTO: 1.4 X10E3/UL (ref 0.7–3.1)
LYMPHOCYTES NFR BLD AUTO: 25 %
MCH RBC QN AUTO: 29.7 PG (ref 26.6–33)
MCHC RBC AUTO-ENTMCNC: 33.6 G/DL (ref 31.5–35.7)
MCV RBC AUTO: 89 FL (ref 79–97)
MONOCYTES # BLD AUTO: 0.4 X10E3/UL (ref 0.1–0.9)
MONOCYTES NFR BLD AUTO: 7 %
NEUTROPHILS # BLD AUTO: 3.6 X10E3/UL (ref 1.4–7)
NEUTROPHILS NFR BLD AUTO: 65 %
PLATELET # BLD AUTO: 163 X10E3/UL (ref 150–379)
PSA SERPL-MCNC: 1.5 NG/ML (ref 0–4)
RBC # BLD AUTO: 5.12 X10E6/UL (ref 4.14–5.8)
WBC # BLD AUTO: 5.4 X10E3/UL (ref 3.4–10.8)

## 2018-01-17 RX ORDER — GLIPIZIDE 5 MG/1
TABLET ORAL
Qty: 270 TAB | Refills: 3 | Status: SHIPPED | OUTPATIENT
Start: 2018-01-17 | End: 2018-11-14 | Stop reason: SDUPTHER

## 2018-03-09 LAB
ALBUMIN/CREAT UR: 68.2 MG/G CREAT (ref 0–30)
BUN SERPL-MCNC: 9 MG/DL (ref 8–27)
BUN/CREAT SERPL: 10 (ref 10–24)
CALCIUM SERPL-MCNC: 9.2 MG/DL (ref 8.6–10.2)
CHLORIDE SERPL-SCNC: 104 MMOL/L (ref 96–106)
CO2 SERPL-SCNC: 25 MMOL/L (ref 18–29)
CREAT SERPL-MCNC: 0.89 MG/DL (ref 0.76–1.27)
CREAT UR-MCNC: 82.2 MG/DL
EST. AVERAGE GLUCOSE BLD GHB EST-MCNC: 189 MG/DL
GFR SERPLBLD CREATININE-BSD FMLA CKD-EPI: 86 ML/MIN/1.73
GFR SERPLBLD CREATININE-BSD FMLA CKD-EPI: 99 ML/MIN/1.73
GLUCOSE SERPL-MCNC: 212 MG/DL (ref 65–99)
HBA1C MFR BLD: 8.2 % (ref 4.8–5.6)
MICROALBUMIN UR-MCNC: 56.1 UG/ML
POTASSIUM SERPL-SCNC: 4.4 MMOL/L (ref 3.5–5.2)
SODIUM SERPL-SCNC: 143 MMOL/L (ref 134–144)

## 2018-03-14 ENCOUNTER — OFFICE VISIT (OUTPATIENT)
Dept: ENDOCRINOLOGY | Age: 73
End: 2018-03-14

## 2018-03-14 VITALS
RESPIRATION RATE: 14 BRPM | HEART RATE: 71 BPM | OXYGEN SATURATION: 95 % | DIASTOLIC BLOOD PRESSURE: 78 MMHG | SYSTOLIC BLOOD PRESSURE: 142 MMHG | WEIGHT: 193 LBS | BODY MASS INDEX: 29.25 KG/M2 | HEIGHT: 68 IN

## 2018-03-14 DIAGNOSIS — I10 ESSENTIAL HYPERTENSION, BENIGN: ICD-10-CM

## 2018-03-14 DIAGNOSIS — E11.21 TYPE 2 DIABETES WITH NEPHROPATHY (HCC): Primary | ICD-10-CM

## 2018-03-14 DIAGNOSIS — E78.5 HYPERLIPIDEMIA LDL GOAL <100: ICD-10-CM

## 2018-03-14 RX ORDER — ATORVASTATIN CALCIUM 10 MG/1
10 TABLET, FILM COATED ORAL DAILY
Qty: 90 TAB | Refills: 3 | Status: SHIPPED | OUTPATIENT
Start: 2018-03-14 | End: 2019-05-26 | Stop reason: SDUPTHER

## 2018-03-14 NOTE — PATIENT INSTRUCTIONS
1) Your Hemoglobin A1c is a 3 month marker of your diabetes control. Goal is less than 7% which means your average blood sugar is less than 150. Your Hemoglobin A1c is 8.2% which means your diabetes is under worse control than 7.3% at your last check. Continue to work on your diet and exercise and take all your medications as directed. 2) I will hold on any changes to your meds. Focus on getting back on track with exercising shooting for 30 minutes 5 days a week of the treadmill. This doesn't have to be done altogether but can be spit throughout the day. 3) Do your best to focus on the meals that are causing you to spike over 180 when checked 2 hours after a meal and limit your portions of these foods. 4) Your blood pressure was just barely over 140/90 but we'll keep your valsartan the same as you are on the max dose and focus on exercise. 5) Microalbumin/creatinine ratio is a marker of the amount of protein in your urine. Goal is less than 30. Your value is abnormal and from last time due to higher A1c. This indicates that your kidneys are being slightly more affected by your uncontrolled diabetes and/or blood pressure. Continue to take valsartan to help protect your kidneys from the effects of diabetes and high blood pressure. 6) I sent 10 mg atorvastatin tabs to the [facility] so you'll take a whole tab daily. Use up the 20 mg tabs taking 1/2 tab daily.

## 2018-03-14 NOTE — PROGRESS NOTES
Chief Complaint   Patient presents with    Diabetes     PCP and Pharmacy Verified. History of Present Illness: Siena Veloz is a 71 y.o. male here for follow up of diabetes. Weight stable since last visit in 9/17. Has been taking the valsartan 320 mg daily. States he has been off track with diet and exercise and his sugars have been in the low 200s recently when checked fasting. Not checking after meals at this time. Compliant with his DM pills. His wife asked about a refresher DM course and I offered to make a referral to the diabetes treatment center but he states he knows what he needs to do and wants to hold on this for now. Compliant with atorvastatin 1/2 tab daily and we will change to a whole 10 mg tab as these are hard to cut. Current Outpatient Prescriptions   Medication Sig    glipiZIDE (GLUCOTROL) 5 mg tablet TAKE 1 TAB BEFORE BREAKFAST AND 2 TABS BEFORE DINNER    atorvastatin (LIPITOR) 20 mg tablet Take 0.5 Tabs by mouth daily. Dose change 4/12/17--updated med list--did not send prescription to the pharmacy    valsartan (DIOVAN) 320 mg tablet Take 1 tab daily. For blood pressure and kidney protection    ONETOUCH ULTRA TEST strip TEST ONCE DAILY    metFORMIN ER (GLUCOPHAGE XR) 500 mg tablet TAKE 1 TAB THREE TIMES DAILY    INVOKANA 300 mg tablet Take 1/2 tab every other day    b complex vitamins tablet Take 1 Tab by mouth daily.  Lancets (ONE TOUCH ULTRASOFT LANCETS) Misc by Does Not Apply route. Check blood sugar as needed. No current facility-administered medications for this visit.       Allergies   Allergen Reactions    Ace Inhibitors Cough    Cialis [Tadalafil] Other (comments)     Back pain    Onglyza [Saxagliptin] Other (comments)     headache     Review of Systems:  - Eyes: no blurry vision or double vision  - Cardiovascular: no chest pain  - Respiratory: no shortness of breath  - Musculoskeletal: no myalgias  - Neurological: occ numbness/tingling in extremities    Physical Examination:  Blood pressure 142/78, pulse 71, resp. rate 14, height 5' 8\" (1.727 m), weight 193 lb (87.5 kg), SpO2 95 %. - General: pleasant, no distress, good eye contact   - Neck: no carotid bruits  - Cardiovascular: regular, normal rate, nl s1 and s2, 2/6 systolic murmur  - Respiratory: clear bilaterally  - Integumentary: no edema,   - Psychiatric: normal mood and affect         Diabetic foot exam performed by Yemi Hyman MD     Measurement  Response Nurse Comment Physician Comment   Monofilament  R - normal sensation with micro filament  L - normal sensation with micro filament     Pulse DP R - 2+ (normal)  L - 2+ (normal)     Pulse TP R -  slightly decreased    L -  slightly decreased       Structural deformity R - None  L - None     Skin Integrity / Deformity R - Mild - callus  L - Mild - callus        Reviewed by:               Data Reviewed:   Component      Latest Ref Rng & Units 3/8/2018 3/8/2018 3/8/2018          12:55 PM 12:55 PM 12:55 PM   Glucose      65 - 99 mg/dL 212 (H)     BUN      8 - 27 mg/dL 9     Creatinine      0.76 - 1.27 mg/dL 0.89     GFR est non-AA      >59 mL/min/1.73 86     GFR est AA      >59 mL/min/1.73 99     BUN/Creatinine ratio      10 - 24 10     Sodium      134 - 144 mmol/L 143     Potassium      3.5 - 5.2 mmol/L 4.4     Chloride      96 - 106 mmol/L 104     CO2      18 - 29 mmol/L 25     Calcium      8.6 - 10.2 mg/dL 9.2     Creatinine, urine      Not Estab. mg/dL  82.2    Microalbumin, urine      Not Estab. ug/mL  56.1    Microalbumin/Creat. Ratio      0.0 - 30.0 mg/g creat  68.2 (H)    Hemoglobin A1c, (calculated)      4.8 - 5.6 %   8.2 (H)   Estimated average glucose      mg/dL   189       Assessment/Plan:     1.  DM w/ Complication: his most recent Hgb A1c was 8.2% in 3/18 up from 7.3% in 9/17 down from 7.8% in 4/17 up from 7.7% in 11/16 up from 7.2% in 6/16 up from 6.9% in 10/15 stable from 6/15 down from 8.9% in 2/15 up from 7.9% in 10/14 down from 8% in 6/14 up from 7.8% in 2/14 up from 7.4% in 10/13 down from 8.9% in June up from 8.7% in Feb 2013 up from 7.2% in Oct stable from July down from 7.6% in March 2012. A1c is up due to being off track with diet and exercise so will focus on this first.  - cont metformin  mg 1 tab tid  - cont glipizide 5 mg in am and 10 mg before dinner  - cont invokana 300 mg 1/2 tab every other day  - check bs 1 time per day and focus on post-meal readings  - foot exam done 3/18  - optho UTD 5/17  - microalbumin nl 7/12, up to 49 in 6/13, down to 20 in 10/13, up to 44 in 10/14 so increased diovan to 160 mg daily but still 44 in 2/15. Down to 16 in 6/15 with better A1c but up to 39 in 10/15 and 47 in 6/16. Down to 32 in 11/16. Up to 42 in 9/17 so increased diovan to 320 but up to 68 in 3/18 with higher A1c  - check Hgb A1c and cmp and microalbumin prior to next visit      2. Unspecified essential hypertension: his BP was above goal < 140/90 but down from last time so will focus on exercise first.  - cont diovan 320 mg daily       3. Other and unspecified hyperlipidemia: Given DM, Goal LDL < 100, non-HDL < 130, and TG < 150. LDL 68 in Feb 2013 and 67 in 10/13 and 66 in 6/14 and 65 in 2/15 and 67 in 10/15 and 71 in 6/16 and 61 in 4/17 on 20 mg of lipitor. I decreased to 10 mg and LDL still 69 in 9/17 so will stay on this dose. - cont lipitor 10 mg daily  - check lipids prior to next visit        Patient Instructions   1) Your Hemoglobin A1c is a 3 month marker of your diabetes control. Goal is less than 7% which means your average blood sugar is less than 150. Your Hemoglobin A1c is 8.2% which means your diabetes is under worse control than 7.3% at your last check. Continue to work on your diet and exercise and take all your medications as directed. 2) I will hold on any changes to your meds. Focus on getting back on track with exercising shooting for 30 minutes 5 days a week of the treadmill.   This doesn't have to be done altogether but can be spit throughout the day. 3) Do your best to focus on the meals that are causing you to spike over 180 when checked 2 hours after a meal and limit your portions of these foods. 4) Your blood pressure was just barely over 140/90 but we'll keep your valsartan the same as you are on the max dose and focus on exercise. 5) Microalbumin/creatinine ratio is a marker of the amount of protein in your urine. Goal is less than 30. Your value is abnormal and from last time due to higher A1c. This indicates that your kidneys are being slightly more affected by your uncontrolled diabetes and/or blood pressure. Continue to take valsartan to help protect your kidneys from the effects of diabetes and high blood pressure. 6) I sent 10 mg atorvastatin tabs to the [facility] so you'll take a whole tab daily. Use up the 20 mg tabs taking 1/2 tab daily. Follow-up Disposition:  Return in about 6 months (around 9/14/2018).     Copy sent to:  Dr. Azael Jefferson via [employer] [facility]

## 2018-03-14 NOTE — MR AVS SNAPSHOT
[address] [facility] [address] [address] 
[phone] Patient: Claudia Carrizales MRN:  :[id number] Visit Information Date & Time Provider Department Dept. Phone Encounter #  
 3/14/2018 10:30 AM Manish Lopez, 1024 [facility] Diabetes and Endocrinology [phone] [id number] Follow-up Instructions Return in about 6 months (around 2018). Your Appointments 10/1/2018 10:00 AM  
Medicare Physical with Elder Cisneros MD  
[facility] Appt Note: CPE (1yr) [address] [facility] [address] [facility] [address]  
[facility] [address] [address] [place] [address] Upcoming Health Maintenance Date Due  
 FOOT EXAM Q1 2017 EYE EXAM RETINAL OR DILATED Q1 2018 HEMOGLOBIN A1C Q6M 2018 LIPID PANEL Q1 2018 MEDICARE YEARLY EXAM 2018 MICROALBUMIN Q1 3/8/2019 GLAUCOMA SCREENING Q2Y 2019 COLONOSCOPY 2020 DTaP/Tdap/Td series (2 - Td) 3/30/2025 Allergies as of 3/14/2018  Review Complete On: 3/14/2018 By: Manish Lopez MD  
  
 Severity Noted Reaction Type Reactions Ace Inhibitors  10/08/2009    Cough Cialis [Tadalafil]  10/08/2009    Other (comments) Back pain Onglyza [Saxagliptin]  2012    Other (comments)  
 headache Current Immunizations  Reviewed on 3/30/2015 Name Date  
 TD Vaccine 2005 Tdap 3/30/2015 11:13 AM  
 ZZZ-RETIRED (DO NOT USE) Pneumococcal Vaccine (Unspecified Type) 2012 Not reviewed this visit You Were Diagnosed With   
  
 Codes Comments Type 2 diabetes with nephropathy (HCC)    -  Primary ICD-10-CM: E11.21 
ICD-9-CM: 250.40, 583.81 Essential hypertension, benign     ICD-10-CM: I10 
ICD-9-CM: 401.1 Hyperlipidemia LDL goal <100     ICD-10-CM: E78.5 ICD-9-CM: 272.4 Vitals BP Pulse Resp Height(growth percentile) Weight(growth percentile) SpO2  
 143/81 (BP 1 Location: Right arm, BP Patient Position: Sitting) 71 14 5' 8\" (1.727 m) 193 lb (87.5 kg) 95% BMI Smoking Status 29.35 kg/m2 Never Smoker Vitals History BMI and BSA Data Body Mass Index Body Surface Area  
 29.35 kg/m 2 2.05 m 2 Preferred Pharmacy Pharmacy Name Phone [facility] [phone] Your Updated Medication List  
  
   
This list is accurate as of 3/14/18 10:52 AM.  Always use your most recent med list.  
  
  
  
  
 atorvastatin 10 mg tablet Commonly known as:  LIPITOR Take 1 Tab by mouth daily. Replaces the 20 mg dose  
  
 b complex vitamins tablet Take 1 Tab by mouth daily. glipiZIDE 5 mg tablet Commonly known as:  GLUCOTROL  
TAKE 1 TAB BEFORE BREAKFAST AND 2 TABS BEFORE DINNER INVOKANA 300 mg tablet Generic drug:  canagliflozin Take 1/2 tab every other day Lancets Misc Commonly known as:  ONETOUCH ULTRASOFT LANCETS  
by Does Not Apply route. Check blood sugar as needed. metFORMIN  mg tablet Commonly known as:  GLUCOPHAGE XR  
TAKE 1 TAB THREE TIMES DAILY  
  
 ONETOUCH ULTRA TEST strip Generic drug:  glucose blood VI test strips TEST ONCE DAILY  
  
 valsartan 320 mg tablet Commonly known as:  DIOVAN Take 1 tab daily. For blood pressure and kidney protection Prescriptions Sent to Pharmacy Refills  
 atorvastatin (LIPITOR) 10 mg tablet 3 Sig: Take 1 Tab by mouth daily. Replaces the 20 mg dose Class: Normal  
 Pharmacy: [facility] [address] Ph #: [phone] Route: Oral  
  
We Performed the Following HEMOGLOBIN A1C WITH EAG [29536 CPT(R)] LIPID PANEL [95819 CPT(R)] METABOLIC PANEL, COMPREHENSIVE [86847 CPT(R)] MICROALBUMIN, UR, RAND W/ MICROALB/CREAT RATIO [id number] CPT(R)] Follow-up Instructions Return in about 6 months (around 9/14/2018). Patient Instructions 1) Your Hemoglobin A1c is a 3 month marker of your diabetes control. Goal is less than 7% which means your average blood sugar is less than 150. Your Hemoglobin A1c is 8.2% which means your diabetes is under worse control than 7.3% at your last check. Continue to work on your diet and exercise and take all your medications as directed. 2) I will hold on any changes to your meds. Focus on getting back on track with exercising shooting for 30 minutes 5 days a week of the treadmill. This doesn't have to be done altogether but can be spit throughout the day. 3) Do your best to focus on the meals that are causing you to spike over 180 when checked 2 hours after a meal and limit your portions of these foods. 4) Your blood pressure was just barely over 140/90 but we'll keep your valsartan the same as you are on the max dose and focus on exercise. 5) Microalbumin/creatinine ratio is a marker of the amount of protein in your urine. Goal is less than 30. Your value is abnormal and from last time due to higher A1c. This indicates that your kidneys are being slightly more affected by your uncontrolled diabetes and/or blood pressure. Continue to take valsartan to help protect your kidneys from the effects of diabetes and high blood pressure. 6) I sent 10 mg atorvastatin tabs to the [facility] so you'll take a whole tab daily. Use up the 20 mg tabs taking 1/2 tab daily. Introducing \[facility]\"" & HEALTH SERVICES! Dear Gladis Diaz: Thank you for requesting a Repligen account. Our records indicate that you already have an active Repligen account. You can access your account anytime at [email or web address] Did you know that you can access your hospital and ER discharge instructions at any time in Repligen? You can also review all of your test results from your hospital stay or ER visit. Additional Information If you have questions, please visit the Frequently Asked Questions section of the [employer] website at [email or web address] Remember, [employer] is NOT to be used for urgent needs. For medical emergencies, dial 911. Now available from your iPhone and Android! Please provide this summary of care documentation to your next provider. Your primary care clinician is listed as STEPHANIE ROBERTS. If you have any questions after today's visit, please call [phone].

## 2018-03-14 NOTE — PROGRESS NOTES
Selvin Petersen is a 71 y.o. male      Chief Complaint   Patient presents with    Diabetes     PCP and Pharmacy Verified. 1. Have you been to the ER, urgent care clinic since your last visit? Hospitalized since your last visit? No    2. Have you seen or consulted any other health care providers outside of the [address] [place] [address] since your last visit? Include any pap smears or colon screening.  No

## 2018-05-31 RX ORDER — METFORMIN HYDROCHLORIDE 500 MG/1
TABLET, EXTENDED RELEASE ORAL
Qty: 270 TAB | Refills: 3 | Status: SHIPPED | OUTPATIENT
Start: 2018-05-31 | End: 2019-05-19 | Stop reason: SDUPTHER

## 2018-07-25 ENCOUNTER — TELEPHONE (OUTPATIENT)
Dept: ENDOCRINOLOGY | Age: 73
End: 2018-07-25

## 2018-07-25 RX ORDER — IRBESARTAN 300 MG/1
300 TABLET ORAL DAILY
Qty: 90 TAB | Refills: 3 | Status: SHIPPED | OUTPATIENT
Start: 2018-07-25 | End: 2019-05-28 | Stop reason: SDUPTHER

## 2018-07-25 NOTE — TELEPHONE ENCOUNTER
----- Message from Michelle Christianson sent at 7/25/2018  3:37 PM EDT -----  Regarding: RE: Prescription Question  Contact: [phone]  The letter states please consult with your doctor about alternative medication.  ----- Message -----  From: Mayank Saini MD  Sent: 7/25/2018  9:51 AM EDT  To: Michelle Saldaña  Subject: RE: Prescription Question    Did they state whether they are going to be able to obtain a replacement for the valsartan from a different  or did they ask you to contact your doctor about finding a different med altogether? Let me know when you have a chance. ----- Message -----     From: Michelle Christianson     Sent: 7/24/2018  8:29 PM EDT       To: Mayank Saini MD  Subject: Prescription Question    I received a letter today (7/24/18) from [address] [place] [address] that [facility] issued a recall on Valsartan. This recall was issued because an unexpected impurity was found in these products that may case health risks. Is there an alternative medicine I can take?       Thank you,

## 2018-07-31 RX ORDER — CANAGLIFLOZIN 300 MG/1
TABLET, FILM COATED ORAL
Qty: 30 TAB | Refills: 11 | Status: SHIPPED | OUTPATIENT
Start: 2018-07-31 | End: 2018-07-31 | Stop reason: SDUPTHER

## 2018-07-31 RX ORDER — CANAGLIFLOZIN 300 MG/1
TABLET, FILM COATED ORAL
Qty: 30 TAB | Refills: 2 | OUTPATIENT
Start: 2018-07-31

## 2018-07-31 RX ORDER — CANAGLIFLOZIN 300 MG/1
TABLET, FILM COATED ORAL
Qty: 30 TAB | Refills: 11
Start: 2018-07-31 | End: 2019-01-07 | Stop reason: SDUPTHER

## 2018-07-31 NOTE — TELEPHONE ENCOUNTER
From: Carmen Sandoval  To: Son Lindsey MD  Sent: 7/31/2018 9:21 AM EDT  Subject: Medication Renewal Request    Original authorizing provider: MD Jay Stapleton.  Annalisa Estes would like a refill of the following medications:  INVOKANA 300 mg tablet Son Lindsey MD]    Preferred pharmacy: [facility]/PHARMACY #[id number] Lash[address] PEREZ [address]    Comment:  I need a refill please,

## 2018-08-01 ENCOUNTER — TELEPHONE (OUTPATIENT)
Dept: ENDOCRINOLOGY | Age: 73
End: 2018-08-01

## 2018-08-02 NOTE — TELEPHONE ENCOUNTER
Sent him the following message through [employer]:    I submitted an authorization for the invokana through our electronic system called covermymeds and received notification that this med has been approved so you should be able to go to the pharmacy to pick this med up. Please let me know if you have any trouble getting this filled.

## 2018-08-03 ENCOUNTER — TELEPHONE (OUTPATIENT)
Dept: ENDOCRINOLOGY | Age: 73
End: 2018-08-03

## 2018-08-03 NOTE — TELEPHONE ENCOUNTER
Please call pt to let him know he has an unread message in [address] I submitted an authorization for the invokana through our electronic system called covermymeds and received notification that this med has been approved so you should be able to go to the pharmacy to pick this med up. Meliza Edgar let me know if you have any trouble getting this filled.

## 2018-08-03 NOTE — TELEPHONE ENCOUNTER
Mrs. Gardunoor Sanchez krissy(on hippa) was notified that the Tonya Golds was approved by his insurance and voiced understanding.

## 2018-09-04 DIAGNOSIS — E11.21 TYPE 2 DIABETES WITH NEPHROPATHY (HCC): Primary | ICD-10-CM

## 2018-09-11 LAB
ALBUMIN SERPL-MCNC: 4.8 G/DL (ref 3.5–4.8)
ALBUMIN/CREAT UR: 19.2 MG/G CREAT (ref 0–30)
ALBUMIN/GLOB SERPL: 1.8 {RATIO} (ref 1.2–2.2)
ALP SERPL-CCNC: 94 IU/L (ref 39–117)
ALT SERPL-CCNC: 12 IU/L (ref 0–44)
AST SERPL-CCNC: 13 IU/L (ref 0–40)
BILIRUB SERPL-MCNC: 0.4 MG/DL (ref 0–1.2)
BUN SERPL-MCNC: 14 MG/DL (ref 8–27)
BUN/CREAT SERPL: 14 (ref 10–24)
CALCIUM SERPL-MCNC: 9.2 MG/DL (ref 8.6–10.2)
CHLORIDE SERPL-SCNC: 107 MMOL/L (ref 96–106)
CHOLEST SERPL-MCNC: 126 MG/DL (ref 100–199)
CO2 SERPL-SCNC: 21 MMOL/L (ref 20–29)
CREAT SERPL-MCNC: 1 MG/DL (ref 0.76–1.27)
CREAT UR-MCNC: 39 MG/DL
EST. AVERAGE GLUCOSE BLD GHB EST-MCNC: 177 MG/DL
GLOBULIN SER CALC-MCNC: 2.6 G/DL (ref 1.5–4.5)
GLUCOSE SERPL-MCNC: 132 MG/DL (ref 65–99)
HBA1C MFR BLD: 7.8 % (ref 4.8–5.6)
HDLC SERPL-MCNC: 29 MG/DL
LDLC SERPL CALC-MCNC: 72 MG/DL (ref 0–99)
MICROALBUMIN UR-MCNC: 7.5 UG/ML
POTASSIUM SERPL-SCNC: 4.7 MMOL/L (ref 3.5–5.2)
PROT SERPL-MCNC: 7.4 G/DL (ref 6–8.5)
SODIUM SERPL-SCNC: 145 MMOL/L (ref 134–144)
TRIGL SERPL-MCNC: 125 MG/DL (ref 0–149)
VLDLC SERPL CALC-MCNC: 25 MG/DL (ref 5–40)

## 2018-09-12 ENCOUNTER — OFFICE VISIT (OUTPATIENT)
Dept: ENDOCRINOLOGY | Age: 73
End: 2018-09-12

## 2018-09-12 VITALS
WEIGHT: 187.6 LBS | HEART RATE: 64 BPM | DIASTOLIC BLOOD PRESSURE: 73 MMHG | SYSTOLIC BLOOD PRESSURE: 127 MMHG | BODY MASS INDEX: 28.43 KG/M2 | HEIGHT: 68 IN

## 2018-09-12 DIAGNOSIS — E11.21 TYPE 2 DIABETES WITH NEPHROPATHY (HCC): Primary | ICD-10-CM

## 2018-09-12 DIAGNOSIS — I10 ESSENTIAL HYPERTENSION, BENIGN: ICD-10-CM

## 2018-09-12 DIAGNOSIS — E78.5 HYPERLIPIDEMIA LDL GOAL <100: ICD-10-CM

## 2018-09-12 NOTE — PROGRESS NOTES
Chief Complaint   Patient presents with    Diabetes     pcp and pharmacy confirmed    Other     eye exam is due     History of Present Illness: Warden Sutherland is a 71 y.o. male here for follow up of diabetes. Weight down 6 lbs since last visit in 3/18. We had to switch from valsartan to irbesartan in 7/18 due to the recall and hasn't had any trouble with this switch and has checked BP twice at home and it was under 140/90. Has been taking a whole 10 mg atorva in place of 1/2 of the 20 mg daily. Was on vacation and went on a cruise in 8/18 and during this week, didn't take the invokana that week but did take the metformin and glipizide. Otherwise has been compliant with this meds. Fasting sugar was 215 this morning and had corn and corn bread last night. Has had some as low as the 130s but other times up to the 170s. If he is more diligent and not eating sweets at night, can get lower readings. Still cutting grass 2 times a week and getting 40-70K steps per week. Current Outpatient Prescriptions   Medication Sig    ONETOUCH ULTRA BLUE TEST STRIP strip TEST ONCE DAILY    INVOKANA 300 mg tablet Take 1/2 tab every other day--Dose change 7/31/18--updated med list--did not send prescription to the pharmacy    irbesartan (AVAPRO) 300 mg tablet Take 1 Tab by mouth daily. Replaces valsartan. For blood pressure and kidney protection    metFORMIN ER (GLUCOPHAGE XR) 500 mg tablet TAKE 1 TAB THREE TIMES DAILY    atorvastatin (LIPITOR) 10 mg tablet Take 1 Tab by mouth daily. Replaces the 20 mg dose    glipiZIDE (GLUCOTROL) 5 mg tablet TAKE 1 TAB BEFORE BREAKFAST AND 2 TABS BEFORE DINNER    b complex vitamins tablet Take 1 Tab by mouth daily.  Lancets (ONE TOUCH ULTRASOFT LANCETS) Misc by Does Not Apply route. Check blood sugar as needed. No current facility-administered medications for this visit.       Allergies   Allergen Reactions    Ace Inhibitors Cough    Cialis [Tadalafil] Other (comments) Back pain    Onglyza [Saxagliptin] Other (comments)     headache     Review of Systems:  - Eyes: no blurry vision or double vision  - Cardiovascular: no chest pain  - Respiratory: no shortness of breath  - Musculoskeletal: no myalgias  - Neurological: some numbness/tingling in extremities    Physical Examination:  Blood pressure 127/73, pulse 64, height 5' 8\" (1.727 m), weight 187 lb 9.6 oz (85.1 kg). - General: pleasant, no distress, good eye contact   - Neck: no carotid bruits  - Cardiovascular: regular, normal rate, nl s1 and s2, no m/r/g,   - Respiratory: clear bilaterally  - Integumentary: no edema,   - Psychiatric: normal mood and affect    Data Reviewed:   Component      Latest Ref Rng & Units 9/10/2018 9/10/2018 9/10/2018 9/10/2018           9:55 AM  9:55 AM  9:55 AM  9:55 AM   Glucose      65 - 99 mg/dL  132 (H)     BUN      8 - 27 mg/dL  14     Creatinine      0.76 - 1.27 mg/dL  1.00     GFR est non-AA      >59 mL/min/1.73  75     GFR est AA      >59 mL/min/1.73  87     BUN/Creatinine ratio      10 - 24  14     Sodium      134 - 144 mmol/L  145 (H)     Potassium      3.5 - 5.2 mmol/L  4.7     Chloride      96 - 106 mmol/L  107 (H)     CO2      20 - 29 mmol/L  21     Calcium      8.6 - 10.2 mg/dL  9.2     Protein, total      6.0 - 8.5 g/dL  7.4     Albumin      3.5 - 4.8 g/dL  4.8     GLOBULIN, TOTAL      1.5 - 4.5 g/dL  2.6     A-G Ratio      1.2 - 2.2  1.8     Bilirubin, total      0.0 - 1.2 mg/dL  0.4     Alk. phosphatase      39 - 117 IU/L  94     AST      0 - 40 IU/L  13     ALT (SGPT)      0 - 44 IU/L  12     Cholesterol, total      100 - 199 mg/dL   126    Triglyceride      0 - 149 mg/dL   125    HDL Cholesterol      >39 mg/dL   29 (L)    VLDL, calculated      5 - 40 mg/dL   25    LDL, calculated      0 - 99 mg/dL   72    Creatinine, urine      Not Estab. mg/dL    39.0   Microalbumin, urine      Not Estab. ug/mL    7.5   Microalbumin/Creat.  Ratio      0.0 - 30.0 mg/g creat    19.2   Hemoglobin A1c, (calculated)      4.8 - 5.6 % 7.8 (H)      Estimated average glucose      mg/dL 177          Assessment/Plan:     1. DM w/ Complication: his most recent Hgb A1c was 7.8% in 9/18 down from 8.2% in 3/18 up from 7.3% in 9/17 down from 7.8% in 4/17 up from 7.7% in 11/16 up from 7.2% in 6/16 up from 6.9% in 10/15 stable from 6/15 down from 8.9% in 2/15 up from 7.9% in 10/14 down from 8% in 6/14 up from 7.8% in 2/14 up from 7.4% in 10/13 down from 8.9% in June up from 8.7% in Feb 2013 up from 7.2% in Oct stable from July down from 7.6% in March 2012. A1c is coming down with weight loss so no changes needed. - cont metformin  mg 1 tab tid  - cont glipizide 5 mg in am and 10 mg before dinner  - cont invokana 300 mg 1/2 tab every other day  - check bs 1 time per day and focus on post-meal readings  - foot exam done 3/18  - optho UTD 5/17  - microalbumin nl 7/12, up to 49 in 6/13, down to 20 in 10/13, up to 44 in 10/14 so increased diovan to 160 mg daily but still 44 in 2/15. Down to 16 in 6/15 with better A1c but up to 39 in 10/15 and 47 in 6/16. Down to 32 in 11/16. Up to 42 in 9/17 so increased diovan to 320 but up to 68 in 3/18 with higher A1c, down to 19.2 in 9/18 with lower A1c  - check Hgb A1c and bmp prior to next visit      2. Unspecified essential hypertension: his BP was at goal < 140/90  - cont irbesartan 300 mg daily       3. Other and unspecified hyperlipidemia: Given DM, Goal LDL < 100, non-HDL < 130, and TG < 150. LDL 68 in Feb 2013 and 67 in 10/13 and 66 in 6/14 and 65 in 2/15 and 67 in 10/15 and 71 in 6/16 and 61 in 4/17 on 20 mg of lipitor. I decreased to 10 mg and LDL still 69 in 9/17 and 72 in 9/18 so will stay on this dose. - cont lipitor 10 mg daily  - check lipids in summer 2019        Patient Instructions   1) Your A1c has come down with weight loss and working on portions. 2) Your cholesterol is at goal and urine protein is normal for the first time in a year.     3) Your blood pressure is at goal and your liver and kidney function are normal.        Follow-up Disposition:  Return in about 5 months (around 2/12/2019).     Copy sent to:  Dr. Sergey Aguayo via [facility]

## 2018-09-12 NOTE — PATIENT INSTRUCTIONS
1) Your A1c has come down with weight loss and working on portions. 2) Your cholesterol is at goal and urine protein is normal for the first time in a year.     3) Your blood pressure is at goal and your liver and kidney function are normal.

## 2018-09-12 NOTE — MR AVS SNAPSHOT
[address] [facility] [address] [place] [address] 
[phone] Patient: Sheri Charles MRN:  :[id number] Visit Information Date & Time Provider Department Dept. Phone Encounter #  
 2018 10:30 AM Juan Briscoe, 1024 [facility] Diabetes and Endocrinology-[facility] [id number] Follow-up Instructions Return in about 5 months (around 2019). Your Appointments 10/1/2018 10:00 AM  
Medicare Physical with Lesley Chavez MD  
[facility] Internal Medicine [facility]) Appt Note: CPE (1yr) [address] [place] [address] [facility] [address]  
[facility] [address] [address] [place] [address] Upcoming Health Maintenance Date Due  
 EYE EXAM RETINAL OR DILATED Q1 2018 Influenza Age 5 to Adult 2018 MEDICARE YEARLY EXAM 2018 HEMOGLOBIN A1C Q6M 3/10/2019 FOOT EXAM Q1 3/14/2019 GLAUCOMA SCREENING Q2Y 2019 MICROALBUMIN Q1 9/10/2019 LIPID PANEL Q1 9/10/2019 COLONOSCOPY 2020 DTaP/Tdap/Td series (2 - Td) 3/30/2025 Allergies as of 2018  Review Complete On: 2018 By: Juan Briscoe MD  
  
 Severity Noted Reaction Type Reactions Ace Inhibitors  10/08/2009    Cough Cialis [Tadalafil]  10/08/2009    Other (comments) Back pain Onglyza [Saxagliptin]  2012    Other (comments)  
 headache Current Immunizations  Reviewed on 3/30/2015 Name Date  
 TD Vaccine 2005 Tdap 3/30/2015 11:13 AM  
 ZZZ-RETIRED (DO NOT USE) Pneumococcal Vaccine (Unspecified Type) 2012 Not reviewed this visit You Were Diagnosed With   
  
 Codes Comments Type 2 diabetes with nephropathy (HCC)    -  Primary ICD-10-CM: E11.21 
ICD-9-CM: 250.40, 583.81 Essential hypertension, benign     ICD-10-CM: I10 
ICD-9-CM: 401.1 Hyperlipidemia LDL goal <100     ICD-10-CM: E78.5 ICD-9-CM: 272.4 Vitals BP Pulse Height(growth percentile) Weight(growth percentile) BMI Smoking Status 127/73 64 5' 8\" (1.727 m) 187 lb 9.6 oz (85.1 kg) 28.52 kg/m2 Never Smoker Vitals History BMI and BSA Data Body Mass Index Body Surface Area 28.52 kg/m 2 2.02 m 2 Preferred Pharmacy Pharmacy Name Phone [facility]/PHARMACY #[id number] [place] [address] [phone] Your Updated Medication List  
  
   
This list is accurate as of 9/12/18 11:30 AM.  Always use your most recent med list.  
  
  
  
  
 atorvastatin 10 mg tablet Commonly known as:  LIPITOR Take 1 Tab by mouth daily. Replaces the 20 mg dose  
  
 b complex vitamins tablet Take 1 Tab by mouth daily. glipiZIDE 5 mg tablet Commonly known as:  GLUCOTROL  
TAKE 1 TAB BEFORE BREAKFAST AND 2 TABS BEFORE DINNER INVOKANA 300 mg tablet Generic drug:  canagliflozin Take 1/2 tab every other day--Dose change 7/31/18--updated med list--did not send prescription to the pharmacy  
  
 irbesartan 300 mg tablet Commonly known as:  AVAPRO Take 1 Tab by mouth daily. Replaces valsartan. For blood pressure and kidney protection Lancets Misc Commonly known as:  ONETOUCH ULTRASOFT LANCETS  
by Does Not Apply route. Check blood sugar as needed. metFORMIN  mg tablet Commonly known as:  GLUCOPHAGE XR  
TAKE 1 TAB THREE TIMES DAILY  
  
 ONETOUCH ULTRA BLUE TEST STRIP strip Generic drug:  glucose blood VI test strips TEST ONCE DAILY We Performed the Following HEMOGLOBIN A1C WITH EAG [48596 CPT(R)] METABOLIC PANEL, BASIC [87749 CPT(R)] Follow-up Instructions Return in about 5 months (around 2/12/2019). Patient Instructions 1) Your A1c has come down with weight loss and working on portions. 2) Your cholesterol is at goal and urine protein is normal for the first time in a year. 3) Your blood pressure is at goal and your liver and kidney function are normal. 
 
 
 
 
  
Introducing ioBridge! Dear James Zayas: Thank you for requesting a Xanodyne account. Our records indicate that you already have an active Xanodyne account. You can access your account anytime at [email or web address] Did you know that you can access your hospital and ER discharge instructions at any time in Xanodyne? You can also review all of your test results from your hospital stay or ER visit. Additional Information If you have questions, please visit the Frequently Asked Questions section of the Xanodyne website at [email or web address] Remember, Xanodyne is NOT to be used for urgent needs. For medical emergencies, dial 911. Now available from your iPhone and Android! Please provide this summary of care documentation to your next provider. Your primary care clinician is listed as STEPHANIE ROBERTS. If you have any questions after today's visit, please call [phone].

## 2018-10-01 ENCOUNTER — OFFICE VISIT (OUTPATIENT)
Dept: INTERNAL MEDICINE CLINIC | Age: 73
End: 2018-10-01

## 2018-10-01 VITALS
OXYGEN SATURATION: 98 % | HEIGHT: 68 IN | BODY MASS INDEX: 28.95 KG/M2 | WEIGHT: 191 LBS | SYSTOLIC BLOOD PRESSURE: 124 MMHG | TEMPERATURE: 98.6 F | RESPIRATION RATE: 16 BRPM | HEART RATE: 62 BPM | DIASTOLIC BLOOD PRESSURE: 70 MMHG

## 2018-10-01 DIAGNOSIS — E11.21 TYPE 2 DIABETES WITH NEPHROPATHY (HCC): ICD-10-CM

## 2018-10-01 DIAGNOSIS — Z23 ENCOUNTER FOR IMMUNIZATION: ICD-10-CM

## 2018-10-01 DIAGNOSIS — Z00.00 ROUTINE GENERAL MEDICAL EXAMINATION AT A HEALTH CARE FACILITY: Primary | ICD-10-CM

## 2018-10-01 DIAGNOSIS — Z12.11 SCREEN FOR COLON CANCER: ICD-10-CM

## 2018-10-01 RX ORDER — GUAIFENESIN 100 MG/5ML
81 LIQUID (ML) ORAL DAILY
Qty: 30 TAB | Refills: 11
Start: 2018-10-01 | End: 2019-02-27

## 2018-10-01 NOTE — MR AVS SNAPSHOT
[address] [facility] [address] [facility] 
[phone] Patient: Amarilis Francis MRN:  [id number] Visit Information Date & Time Provider Department Dept. Phone Encounter #  
 10/1/2018 10:00 AM Pratibha Regan, [address] [place] [address] Internal Medicine [phone] [id number] Follow-up Instructions Return in about 1 year (around 10/1/2019). Your Appointments 2019 10:10 AM  
Follow Up with MD Juan Morley Diabetes and Endocrinology-[facility]) Appt Note: f/u diabetes cp0.00  
 [address] [facility] [address] [facility] [address]  
[phone]  
  
   
 [address] [facility] [address] Upcoming Health Maintenance Date Due  
 EYE EXAM RETINAL OR DILATED Q1 2019* Influenza Age 5 to Adult 2019* Shingrix Vaccine Age 50> (1 of 2) 10/1/2019* HEMOGLOBIN A1C Q6M 3/10/2019 FOOT EXAM Q1 3/14/2019 GLAUCOMA SCREENING Q2Y 2019 MICROALBUMIN Q1 9/10/2019 LIPID PANEL Q1 9/10/2019 MEDICARE YEARLY EXAM 10/2/2019 COLONOSCOPY 2020 DTaP/Tdap/Td series (2 - Td) 3/30/2025 *Topic was postponed. The date shown is not the original due date. Allergies as of 10/1/2018  Review Complete On: 10/1/2018 By: Pratibha Regan MD  
  
 Severity Noted Reaction Type Reactions Ace Inhibitors  10/08/2009    Cough Cialis [Tadalafil]  10/08/2009    Other (comments) Back pain Onglyza [Saxagliptin]  2012    Other (comments)  
 headache Current Immunizations  Reviewed on 10/1/2018 Name Date  
 TD Vaccine 2005 Tdap 3/30/2015 11:13 AM  
 ZZZ-RETIRED (DO NOT USE) Pneumococcal Vaccine (Unspecified Type) 2012 Reviewed by Pratibha Regan MD on 10/1/2018 at 10:28 AM  
You Were Diagnosed With   
  
 Codes Comments Routine general medical examination at a health care facility    -  Primary ICD-10-CM: Z00.00 ICD-9-CM: V70.0 Encounter for immunization     ICD-10-CM: Q77 ICD-9-CM: V03.89 Screen for colon cancer     ICD-10-CM: Z12.11 ICD-9-CM: V76.51 Type 2 diabetes with nephropathy (HCC)     ICD-10-CM: E11.21 
ICD-9-CM: 250.40, 583.81 Vitals BP Pulse Temp Resp Height(growth percentile) Weight(growth percentile) 124/70 62 98.6 °F (37 °C) (Oral) 16 5' 8\" (1.727 m) 191 lb (86.6 kg) SpO2 BMI Smoking Status 98% 29.04 kg/m2 Never Smoker BMI and BSA Data Body Mass Index Body Surface Area 29.04 kg/m 2 2.04 m 2 Preferred Pharmacy Pharmacy Name Phone [id number] [address] [place] [address] [phone] Your Updated Medication List  
  
   
This list is accurate as of 10/1/18 10:41 AM.  Always use your most recent med list.  
  
  
  
  
 aspirin 81 mg chewable tablet Take 1 Tab by mouth daily. atorvastatin 10 mg tablet Commonly known as:  LIPITOR Take 1 Tab by mouth daily. Replaces the 20 mg dose  
  
 b complex vitamins tablet Take 1 Tab by mouth daily. glipiZIDE 5 mg tablet Commonly known as:  GLUCOTROL  
TAKE 1 TAB BEFORE BREAKFAST AND 2 TABS BEFORE DINNER INVOKANA 300 mg tablet Generic drug:  canagliflozin Take 1/2 tab every other day--Dose change 7/31/18--updated med list--did not send prescription to the pharmacy  
  
 irbesartan 300 mg tablet Commonly known as:  AVAPRO Take 1 Tab by mouth daily. Replaces valsartan. For blood pressure and kidney protection Lancets Misc Commonly known as:  ONETOUCH ULTRASOFT LANCETS  
by Does Not Apply route. Check blood sugar as needed. metFORMIN  mg tablet Commonly known as:  GLUCOPHAGE XR  
TAKE 1 TAB THREE TIMES DAILY  
  
 ONETOUCH ULTRA BLUE TEST STRIP strip Generic drug:  glucose blood VI test strips TEST ONCE DAILY pneumococcal 13 fabian conj dip 0.5 mL Syrg injection Commonly known as:  PREVNAR-13  
 0.5 mL by IntraMUSCular route once for 1 dose. varicella-zoster recombinant (PF) 50 mcg/0.5 mL Susr injection Commonly known as:  SHINGRIX  
0.5 mL by IntraMUSCular route once for 1 dose. Repeat in 2 to 6 months Prescriptions Printed Refills  
 pneumococcal 13 fabian conj dip (PREVNAR-13) 0.5 mL syrg injection 0 Si.5 mL by IntraMUSCular route once for 1 dose. Class: Print Route: IntraMUSCular  
 varicella-zoster recombinant, PF, (SHINGRIX) 50 mcg/0.5 mL susr injection 1 Si.5 mL by IntraMUSCular route once for 1 dose. Repeat in 2 to 6 months Class: Print Route: IntraMUSCular We Performed the Following CBC WITH AUTOMATED DIFF [32391 CPT(R)] OCCULT BLOOD IMMUNOASSAY,DIAGNOSTIC [10633 CPT(R)] PSA SCREENING (SCREENING) [ [facility]] TSH 3RD GENERATION [98549 CPT(R)] Follow-up Instructions Return in about 1 year (around 10/1/2019). Introducing [facility]! Dear Manuela Fleischer: Thank you for requesting a [employer] account. Our records indicate that you already have an active [employer] account. You can access your account anytime at [email or web address] Did you know that you can access your hospital and ER discharge instructions at any time in [employer]? You can also review all of your test results from your hospital stay or ER visit. Additional Information If you have questions, please visit the Frequently Asked Questions section of the [employer] website at [email or web address] Remember, [employer] is NOT to be used for urgent needs. For medical emergencies, dial 911. Now available from your iPhone and Android! Please provide this summary of care documentation to your next provider. Your primary care clinician is listed as STEPHANIE ROBERTS. If you have any questions after today's visit, please call [phone].

## 2018-10-01 NOTE — PROGRESS NOTES
HISTORY OF PRESENT ILLNESS Agustin Reynoso is a 71 y.o. male. HPI Lyric Henson is seen today for a complete physical examination and follow up of chronic problems. Preventive medicine. He is due for a complete physical examination, few select lab studies, Prevnar vaccine, shingles vaccine, and due for colorectal cancer screening with stool OB for which a kit is provided. He is up to date with a .tadp booster, Pneumonvax, colonoscopy. He declines the flu vaccine. Chronic medical problems are reviewed. Diabetes and cholesterol are followed by Dr. Leslie Byrd. His nighttime cough has improved with Listerine gargle and saline nasal spray. Regarding medications. I have advised him to start a daily 81 mg aspirin daily. We counseled regarding healthy lifestyle issues including diet, exercise and stress management. Family history, social history, etc. Are reviewed and updated, see electronic record. Review of Systems Constitutional: Negative for weight loss. HENT: Positive for congestion. Respiratory: Positive for cough. Cardiovascular: Negative for chest pain, palpitations, leg swelling and PND. Gastrointestinal: Positive for diarrhea. Diarrhea on occasion Genitourinary: Negative. Musculoskeletal: Negative for myalgias. Neurological: Negative for focal weakness. Physical Exam  
Constitutional: He is oriented to person, place, and time. He appears well-developed and well-nourished. No distress. HENT:  
Head: Normocephalic and atraumatic. Right Ear: Tympanic membrane, external ear and ear canal normal.  
Left Ear: Tympanic membrane, external ear and ear canal normal.  
Eyes: EOM are normal. Pupils are equal, round, and reactive to light. Right eye exhibits no discharge. Left eye exhibits no discharge. Neck: Normal range of motion. Neck supple. Carotid bruit is not present. No thyromegaly present.   
Cardiovascular: Normal rate, regular rhythm, normal heart sounds and intact distal pulses. Exam reveals no gallop and no friction rub. No murmur heard. Pulmonary/Chest: Effort normal and breath sounds normal. No respiratory distress. He has no wheezes. He has no rales. Abdominal: Soft. Bowel sounds are normal. He exhibits no distension and no mass. There is no tenderness. There is no rebound and no guarding. Musculoskeletal: Normal range of motion. He exhibits no edema or tenderness. Lymphadenopathy:  
  He has no cervical adenopathy. Neurological: He is alert and oriented to person, place, and time. He has normal reflexes. Skin: Skin is warm and dry. No rash noted. Psychiatric: He has a normal mood and affect. His behavior is normal.  
Nursing note and vitals reviewed. ASSESSMENT and PLAN Diagnoses and all orders for this visit: 1. Routine general medical examination at a health care facility 
-     CBC WITH AUTOMATED DIFF 
-     TSH 3RD GENERATION 
-     PSA SCREENING (SCREENING) 2. Encounter for immunization -     pneumococcal 13 fabian conj dip (PREVNAR-13) 0.5 mL syrg injection; 0.5 mL by IntraMUSCular route once for 1 dose. 
-     varicella-zoster recombinant, PF, (SHINGRIX) 50 mcg/0.5 mL susr injection; 0.5 mL by IntraMUSCular route once for 1 dose. Repeat in 2 to 6 months 3. Screen for colon cancer -     OCCULT BLOOD IMMUNOASSAY,DIAGNOSTIC 4. Type 2 diabetes with nephropathy (HCC) -     aspirin 81 mg chewable tablet; Take 1 Tab by mouth daily.

## 2018-10-02 LAB
BASOPHILS # BLD AUTO: 0 X10E3/UL (ref 0–0.2)
BASOPHILS NFR BLD AUTO: 1 %
EOSINOPHIL # BLD AUTO: 0.1 X10E3/UL (ref 0–0.4)
EOSINOPHIL NFR BLD AUTO: 2 %
ERYTHROCYTE [DISTWIDTH] IN BLOOD BY AUTOMATED COUNT: 14.6 % (ref 12.3–15.4)
HCT VFR BLD AUTO: 45 % (ref 37.5–51)
HGB BLD-MCNC: 14.8 G/DL (ref 13–17.7)
IMM GRANULOCYTES # BLD: 0 X10E3/UL (ref 0–0.1)
IMM GRANULOCYTES NFR BLD: 0 %
LYMPHOCYTES # BLD AUTO: 1.3 X10E3/UL (ref 0.7–3.1)
LYMPHOCYTES NFR BLD AUTO: 23 %
MCH RBC QN AUTO: 29.5 PG (ref 26.6–33)
MCHC RBC AUTO-ENTMCNC: 32.9 G/DL (ref 31.5–35.7)
MCV RBC AUTO: 90 FL (ref 79–97)
MONOCYTES # BLD AUTO: 0.5 X10E3/UL (ref 0.1–0.9)
MONOCYTES NFR BLD AUTO: 9 %
NEUTROPHILS # BLD AUTO: 3.8 X10E3/UL (ref 1.4–7)
NEUTROPHILS NFR BLD AUTO: 65 %
PLATELET # BLD AUTO: 169 X10E3/UL (ref 150–379)
PSA SERPL-MCNC: 1.7 NG/ML (ref 0–4)
RBC # BLD AUTO: 5.01 X10E6/UL (ref 4.14–5.8)
TSH SERPL DL<=0.005 MIU/L-ACNC: 1.66 UIU/ML (ref 0.45–4.5)
WBC # BLD AUTO: 5.8 X10E3/UL (ref 3.4–10.8)

## 2018-10-24 LAB
HEMOCCULT STL QL IA: NEGATIVE
SPECIMEN STATUS REPORT, ROLRST: NORMAL

## 2018-11-14 RX ORDER — GLIPIZIDE 5 MG/1
TABLET ORAL
Qty: 270 TAB | Refills: 3 | Status: SHIPPED | OUTPATIENT
Start: 2018-11-14 | End: 2019-02-19 | Stop reason: SDUPTHER

## 2019-01-07 RX ORDER — CANAGLIFLOZIN 300 MG/1
TABLET, FILM COATED ORAL
Qty: 30 TAB | Refills: 11 | Status: SHIPPED | OUTPATIENT
Start: 2019-01-07 | End: 2019-09-04 | Stop reason: SDUPTHER

## 2019-02-19 RX ORDER — GLIPIZIDE 5 MG/1
TABLET ORAL
Qty: 270 TAB | Refills: 3 | Status: SHIPPED | OUTPATIENT
Start: 2019-02-19 | End: 2020-02-19

## 2019-02-26 LAB
BUN SERPL-MCNC: 12 MG/DL (ref 8–27)
BUN/CREAT SERPL: 11 (ref 10–24)
CALCIUM SERPL-MCNC: 9.7 MG/DL (ref 8.6–10.2)
CHLORIDE SERPL-SCNC: 105 MMOL/L (ref 96–106)
CO2 SERPL-SCNC: 22 MMOL/L (ref 20–29)
CREAT SERPL-MCNC: 1.06 MG/DL (ref 0.76–1.27)
EST. AVERAGE GLUCOSE BLD GHB EST-MCNC: 186 MG/DL
GLUCOSE SERPL-MCNC: 187 MG/DL (ref 65–99)
HBA1C MFR BLD: 8.1 % (ref 4.8–5.6)
POTASSIUM SERPL-SCNC: 4.9 MMOL/L (ref 3.5–5.2)
SODIUM SERPL-SCNC: 144 MMOL/L (ref 134–144)

## 2019-02-27 ENCOUNTER — OFFICE VISIT (OUTPATIENT)
Dept: ENDOCRINOLOGY | Age: 74
End: 2019-02-27

## 2019-02-27 VITALS
DIASTOLIC BLOOD PRESSURE: 84 MMHG | HEIGHT: 68 IN | BODY MASS INDEX: 28.82 KG/M2 | WEIGHT: 190.2 LBS | SYSTOLIC BLOOD PRESSURE: 146 MMHG | HEART RATE: 70 BPM

## 2019-02-27 DIAGNOSIS — E78.5 HYPERLIPIDEMIA LDL GOAL <100: ICD-10-CM

## 2019-02-27 DIAGNOSIS — E11.21 TYPE 2 DIABETES WITH NEPHROPATHY (HCC): Primary | ICD-10-CM

## 2019-02-27 DIAGNOSIS — I10 ESSENTIAL HYPERTENSION, BENIGN: ICD-10-CM

## 2019-02-27 NOTE — PROGRESS NOTES
Chief Complaint Patient presents with  Diabetes  
  pcp and pharmacy confirmed History of Present Illness: Ayah Kirby is a 68 y.o. male here for follow up of diabetes. Weight up 3 lbs since last visit in 9/18. Only checking 3-4 times a week at most and fasting sugar was 169 today. 30 day avg is 175. Once a month may be up to 200 in the morning. Has missed several doses of evening meds when he gets active. Does drink a reg soda everyday at lunch and we discussed cutting this out. BP up today as he was telling a story of his frustration with a pharmacist trying to guilt him into taking a flu shot and he still refused. He does not recall Dr. Hillary Kim telling his to start a baby aspirin and therefore has remained off this and I think he can hold on this for now. Current Outpatient Medications Medication Sig  
 glipiZIDE (GLUCOTROL) 5 mg tablet TAKE 1 TAB BEFORE BREAKFAST AND 2 TABS BEFORE DINNER  
 INVOKANA 300 mg tablet Take 1/2 tab every other day  ONETOUCH ULTRA BLUE TEST STRIP strip TEST ONCE DAILY  irbesartan (AVAPRO) 300 mg tablet Take 1 Tab by mouth daily. Replaces valsartan. For blood pressure and kidney protection  metFORMIN ER (GLUCOPHAGE XR) 500 mg tablet TAKE 1 TAB THREE TIMES DAILY  atorvastatin (LIPITOR) 10 mg tablet Take 1 Tab by mouth daily. Replaces the 20 mg dose  b complex vitamins tablet Take 1 Tab by mouth daily.  Lancets (ONE TOUCH ULTRASOFT LANCETS) Misc by Does Not Apply route. Check blood sugar as needed. No current facility-administered medications for this visit. Allergies Allergen Reactions  Ace Inhibitors Cough  Cialis [Tadalafil] Other (comments) Back pain  Onglyza [Saxagliptin] Other (comments)  
  headache Review of Systems: - Eyes: no blurry vision or double vision - Cardiovascular: no chest pain - Respiratory: no shortness of breath - Musculoskeletal: no myalgias - Neurological: occ numbness/tingling in extremities Physical Examination: 
Blood pressure 146/84, pulse 70, height 5' 8\" (1.727 m), weight 190 lb 3.2 oz (86.3 kg). - General: pleasant, no distress, good eye contact  
- Neck: no carotid bruits - Cardiovascular: regular, normal rate, nl s1 and s2, no m/r/g,  
- Respiratory: clear bilaterally - Integumentary: no edema,  
- Psychiatric: normal mood and affect Diabetic foot exam:  
 
Left Foot: 
 Visual Exam: callous - mild Pulse DP: 2+ (normal) Filament test: normal sensation Vibratory sensation: diminished Right Foot: 
 Visual Exam: callous - mild Pulse DP: 2+ (normal) Filament test: normal sensation Vibratory sensation: diminished Data Reviewed:  
Component Latest Ref Rng & Units 2/25/2019 2/25/2019  
 
     10:01 AM 10:01 AM  
Glucose 65 - 99 mg/dL 187 (H) BUN 
    8 - 27 mg/dL 12 Creatinine 
    0.76 - 1.27 mg/dL 1.06   
GFR est non-AA 
    >59 mL/min/1.73 70 GFR est AA 
    >59 mL/min/1.73 81 BUN/Creatinine ratio 10 - 24 11 Sodium 134 - 144 mmol/L 144 Potassium 3.5 - 5.2 mmol/L 4.9 Chloride 96 - 106 mmol/L 105 CO2 
    20 - 29 mmol/L 22 Calcium 8.6 - 10.2 mg/dL 9.7 Hemoglobin A1c, (calculated) 4.8 - 5.6 %  8.1 (H) Estimated average glucose 
    mg/dL  186 Assessment/Plan: 1. DM w/ Complication: his most recent Hgb A1c was 8.1% in 2/19 up from 7.8% in 9/18 down from 8.2% in 3/18 up from 7.3% in 9/17 down from 7.8% in 4/17 up from 7.7% in 11/16 up from 7.2% in 6/16 up from 6.9% in 10/15 stable from 6/15 down from 8.9% in 2/15 up from 7.9% in 10/14 down from 8% in 6/14 up from 7.8% in 2/14 up from 7.4% in 10/13 down from 8.9% in June up from 8.7% in Feb 2013 up from 7.2% in Oct stable from July down from 7.6% in March 2012. A1c is up due to less activity and diet 
- cont metformin  mg 1 tab tid - cont glipizide 5 mg in am and 10 mg before dinner 
- cont invokana 300 mg 1/2 tab every other day 
- check bs 1 time per day and focus on post-meal readings 
- foot exam done 2/19 
- optho UTD 10/18 
- microalbumin nl 7/12, up to 49 in 6/13, down to 20 in 10/13, up to 44 in 10/14 so increased diovan to 160 mg daily but still 44 in 2/15. Down to 16 in 6/15 with better A1c but up to 39 in 10/15 and 47 in 6/16. Down to 32 in 11/16. Up to 42 in 9/17 so increased diovan to 320 but up to 68 in 3/18 with higher A1c, down to 19.2 in 9/18 with lower A1c 
- check Hgb A1c and cmp and microalbumin prior to next visit 2. Unspecified essential hypertension: his BP was just above goal < 140/90 
- cont irbesartan 300 mg daily 3. Other and unspecified hyperlipidemia: Given DM, Goal LDL < 100, non-HDL < 130, and TG < 150. LDL 68 in Feb 2013 and 67 in 10/13 and 66 in 6/14 and 65 in 2/15 and 67 in 10/15 and 71 in 6/16 and 61 in 4/17 on 20 mg of lipitor. I decreased to 10 mg and LDL still 69 in 9/17 and 72 in 9/18 so will stay on this dose. - cont lipitor 10 mg daily - check lipids prior to next visit Patient Instructions 1) Your A1c was slightly higher at 8.1% up from 7.8%. 2) Try to check at least 4 times a week either fasting (goal is ) or 2 hours after a meal (goal is less than 180). Alternate one meal a day to check (example: one day check after breakfast, one day after lunch, one day after dinner). Write down what you ate if your blood sugar is more than 180 so you can know to cut back or cut out this food from your diet. 3) BUN and creatinine are markers of kidney function. Your values are normal. 
 
 
 
 
Follow-up Disposition: 
Return in about 5 months (around 7/27/2019). Copy sent to: 
Dr. Josh Soto via [facility]

## 2019-02-27 NOTE — PATIENT INSTRUCTIONS
1) Your A1c was slightly higher at 8.1% up from 7.8%. 2) Try to check at least 4 times a week either fasting (goal is ) or 2 hours after a meal (goal is less than 180). Alternate one meal a day to check (example: one day check after breakfast, one day after lunch, one day after dinner). Write down what you ate if your blood sugar is more than 180 so you can know to cut back or cut out this food from your diet. 3) BUN and creatinine are markers of kidney function.   Your values are normal.

## 2019-05-20 RX ORDER — METFORMIN HYDROCHLORIDE 500 MG/1
TABLET, EXTENDED RELEASE ORAL
Qty: 270 TAB | Refills: 3 | Status: SHIPPED | OUTPATIENT
Start: 2019-05-20 | End: 2019-05-26 | Stop reason: SDUPTHER

## 2019-05-27 RX ORDER — ATORVASTATIN CALCIUM 10 MG/1
10 TABLET, FILM COATED ORAL DAILY
Qty: 90 TAB | Refills: 3 | Status: SHIPPED | OUTPATIENT
Start: 2019-05-27 | End: 2020-04-28

## 2019-05-27 RX ORDER — METFORMIN HYDROCHLORIDE 500 MG/1
TABLET, EXTENDED RELEASE ORAL
Qty: 270 TAB | Refills: 3 | Status: SHIPPED | OUTPATIENT
Start: 2019-05-27 | End: 2020-02-26 | Stop reason: ALTCHOICE

## 2019-05-28 RX ORDER — IRBESARTAN 300 MG/1
300 TABLET ORAL DAILY
Qty: 90 TAB | Refills: 3 | Status: SHIPPED | OUTPATIENT
Start: 2019-05-28 | End: 2019-06-19 | Stop reason: SDUPTHER

## 2019-06-19 RX ORDER — IRBESARTAN 300 MG/1
300 TABLET ORAL DAILY
Qty: 90 TAB | Refills: 3 | Status: SHIPPED | OUTPATIENT
Start: 2019-06-19 | End: 2019-07-03 | Stop reason: RX

## 2019-06-21 ENCOUNTER — OFFICE VISIT (OUTPATIENT)
Dept: INTERNAL MEDICINE CLINIC | Age: 74
End: 2019-06-21

## 2019-06-21 VITALS
WEIGHT: 194 LBS | HEART RATE: 68 BPM | DIASTOLIC BLOOD PRESSURE: 76 MMHG | BODY MASS INDEX: 29.4 KG/M2 | OXYGEN SATURATION: 97 % | HEIGHT: 68 IN | SYSTOLIC BLOOD PRESSURE: 124 MMHG | TEMPERATURE: 97.9 F | RESPIRATION RATE: 18 BRPM

## 2019-06-21 DIAGNOSIS — R05.9 COUGH: Primary | ICD-10-CM

## 2019-06-21 RX ORDER — OMEPRAZOLE 40 MG/1
40 CAPSULE, DELAYED RELEASE ORAL DAILY
Qty: 30 CAP | Refills: 0 | Status: SHIPPED | OUTPATIENT
Start: 2019-06-21 | End: 2019-07-13 | Stop reason: SDUPTHER

## 2019-06-21 NOTE — PROGRESS NOTES
Dary Garvey is a 68 y.o. male who was seen in clinic today (6/21/2019) for an acute visit. Assessment & Plan:     Diagnoses and all orders for this visit:    1. Cough- this is a chronic problem but new to me, symptoms are: stable, differential dx reviewed with the patient, would favor more silent reflux then post nasal drip or pulmonary pathology. Will treat with: diet changes, meds below. Red flags were reviewed with the patient to RTC or notify me, expected time course for resolution reviewed. He will update me in 2-3 weeks if no changes. May need imaging if no changes. -     omeprazole (PRILOSEC) 40 mg capsule; Take 1 Cap by mouth daily. Follow-up and Dispositions    · Return if symptoms worsen or fail to improve. Subjective:   Rolo Hager was seen today for Cough; High Blood Sugar; and Abdominal Pain (with eating sometimes.)    Pulmonary Review  Rolo Hager returns to clinic today to talk about a cough for the last year, which is stable since that time. He reports needing to cough & clear his throat. Will bring up phlegm. It is worse when laying down but can occur standing up. He denies a history of: sinus congestion, post nasal drip, rhinorrhea, sore throat, chest congestion, wheezing and SOB. He denies any reflux or abdominal pain, nausea or vomiting. Treatments have included: nothing. He did use some allergy medications in the spring but it did not effect the cough. Relevant PMH: CAROLINE and allergic rhinitis. Prior to Admission medications    Medication Sig Start Date End Date Taking? Authorizing Provider   irbesartan (AVAPRO) 300 mg tablet Take 1 Tab by mouth daily. Replaces valsartan. For blood pressure and kidney protection 6/19/19  Yes Lexy Rose MD   atorvastatin (LIPITOR) 10 mg tablet Take 1 Tab by mouth daily.  Replaces the 20 mg dose 5/27/19  Yes Lexy Rose MD   metFORMIN ER (GLUCOPHAGE XR) 500 mg tablet TAKE 1 TAB BY MOUTH 3 TIMES DAILY 5/27/19 Yes Wilton Sahu MD   glipiZIDE (GLUCOTROL) 5 mg tablet TAKE 1 TAB BEFORE BREAKFAST AND 2 TABS BEFORE DINNER 2/19/19  Yes Wilton Sahu MD   INVOKANA 300 mg tablet Take 1/2 tab every other day 1/7/19  Yes Wilton Sahu MD   [facility] ULTRA BLUE TEST STRIP strip TEST ONCE DAILY 9/4/18  Yes Wilton Sahu MD   b complex vitamins tablet Take 1 Tab by mouth daily. Yes Provider, Historical   Lancets (ONE TOUCH ULTRASOFT LANCETS) Misc by Does Not Apply route. Check blood sugar as needed. 7/6/11  Yes Thiago Churchill MD          Allergies   Allergen Reactions    Ace Inhibitors Cough    Cialis [Tadalafil] Other (comments)     Back pain    Onglyza [Saxagliptin] Other (comments)     headache           ROS - per HPI        Objective:   Physical Exam   Constitutional: No distress. HENT:   Mouth/Throat: Uvula is midline and mucous membranes are normal. No oropharyngeal exudate or posterior oropharyngeal erythema. Eyes: Conjunctivae are normal. No scleral icterus. Neck: Neck supple. Cardiovascular: Regular rhythm and normal heart sounds. No murmur heard. Pulmonary/Chest: Effort normal and breath sounds normal. He has no wheezes. He has no rales. Lymphadenopathy:     He has no cervical adenopathy. Visit Vitals  /76   Pulse 68   Temp 97.9 °F (36.6 °C) (Oral)   Resp 18   Ht 5' 8\" (1.727 m)   Wt 194 lb (88 kg)   SpO2 97%   BMI 29.50 kg/m²         Disclaimer:  Advised him to call back or return to office if symptoms worsen/change/persist.  Discussed expected course/resolution/complications of diagnosis in detail with patient. Medication risks/benefits/costs/interactions/alternatives discussed with patient. He was given an after visit summary which includes diagnoses, current medications, & vitals. He expressed understanding with the diagnosis and plan. Aspects of this note may have been generated using voice recognition software.  Despite editing, there may be some syntax errors.        Isabelle Oro MD

## 2019-07-02 ENCOUNTER — TELEPHONE (OUTPATIENT)
Dept: ENDOCRINOLOGY | Age: 74
End: 2019-07-02

## 2019-07-02 NOTE — TELEPHONE ENCOUNTER
I received a fax from [address] [place] [address] that the irbesartan 300 mg tab is on backorder. Can you call [phone] (reference # [id number]) and find out if they have the 75 or 150 mg strength tabs so I can use these instead or do I have to change to losartan instead?

## 2019-07-03 RX ORDER — LOSARTAN POTASSIUM 100 MG/1
100 TABLET ORAL DAILY
Qty: 90 TAB | Refills: 3 | Status: SHIPPED | OUTPATIENT
Start: 2019-07-03 | End: 2020-08-03 | Stop reason: SDUPTHER

## 2019-07-03 NOTE — TELEPHONE ENCOUNTER
I will send 100 mg of losartan to [facility] to replace the irbesartan. Please let the patient know I am making this change due to the pharmacy contacting us that his med is on backorder. This dose is equivalent to 300 mg of irbesartan.

## 2019-07-03 NOTE — TELEPHONE ENCOUNTER
[address] with [facility] stated that 75 mg and the 150 mg strength are also not available. She stated that the losartan can be ordered instead.

## 2019-07-05 NOTE — TELEPHONE ENCOUNTER
----- Message from Marge Marroquin sent at 7/5/2019  9:56 AM EDT -----  Regarding: Dr Lali Alvares  Pt's wife (p) [phone], said they are returning the nurses call

## 2019-07-10 ENCOUNTER — TELEPHONE (OUTPATIENT)
Dept: ENDOCRINOLOGY | Age: 74
End: 2019-07-10

## 2019-07-13 DIAGNOSIS — R05.9 COUGH: ICD-10-CM

## 2019-07-15 RX ORDER — OMEPRAZOLE 40 MG/1
CAPSULE, DELAYED RELEASE ORAL
Qty: 30 CAP | Refills: 0 | Status: SHIPPED | OUTPATIENT
Start: 2019-07-15 | End: 2019-07-31

## 2019-07-15 NOTE — TELEPHONE ENCOUNTER
Please call patient. See for ACS, acute visit last month, given Prilosec to see if cough had a silent reflux part. Pt never called back to update me if this helped. Please inquire how he is doing.

## 2019-07-16 ENCOUNTER — TELEPHONE (OUTPATIENT)
Dept: ENDOCRINOLOGY | Age: 74
End: 2019-07-16

## 2019-07-16 NOTE — TELEPHONE ENCOUNTER
----- Message from Wali Randhawa sent at 7/15/2019  4:39 PM EDT -----  Regarding: Dr Mauro Handler Message/Vendor Calls    Caller's first and last name: Leila Becker. wife      Reason for call: Caller is requesting a call from [employer] regarding pt's new medication.        Callback required yes/no and why:      Best contact number(s):[phone]      Details to clarify the request:      Wali Randhawa

## 2019-07-18 NOTE — TELEPHONE ENCOUNTER
Ms Sharla Bosworth  ([address] [place] [address])called regarding the losartan that was sent in for her . I informed her as I did her  that it was on backorder. She stated that he currently is still taking the irbersartan however he does not want to take the losartan due to all the side effects that are listed. Lord Hammond She wanted to know how long it would be on back order. I informed her that she would have to check with the pharmacy and she stated okay.   She will let us know what she finds out

## 2019-07-27 LAB
ALBUMIN SERPL-MCNC: 4.5 G/DL (ref 3.5–4.8)
ALBUMIN/CREAT UR: 10.8 MG/G CREAT (ref 0–30)
ALBUMIN/GLOB SERPL: 1.6 {RATIO} (ref 1.2–2.2)
ALP SERPL-CCNC: 113 IU/L (ref 39–117)
ALT SERPL-CCNC: 12 IU/L (ref 0–44)
AST SERPL-CCNC: 11 IU/L (ref 0–40)
BILIRUB SERPL-MCNC: 0.6 MG/DL (ref 0–1.2)
BUN SERPL-MCNC: 13 MG/DL (ref 8–27)
BUN/CREAT SERPL: 14 (ref 10–24)
CALCIUM SERPL-MCNC: 9.2 MG/DL (ref 8.6–10.2)
CHLORIDE SERPL-SCNC: 103 MMOL/L (ref 96–106)
CHOLEST SERPL-MCNC: 123 MG/DL (ref 100–199)
CO2 SERPL-SCNC: 22 MMOL/L (ref 20–29)
CREAT SERPL-MCNC: 0.94 MG/DL (ref 0.76–1.27)
CREAT UR-MCNC: 41.5 MG/DL
EST. AVERAGE GLUCOSE BLD GHB EST-MCNC: 183 MG/DL
GLOBULIN SER CALC-MCNC: 2.9 G/DL (ref 1.5–4.5)
GLUCOSE SERPL-MCNC: 152 MG/DL (ref 65–99)
HBA1C MFR BLD: 8 % (ref 4.8–5.6)
HDLC SERPL-MCNC: 32 MG/DL
LDLC SERPL CALC-MCNC: 78 MG/DL (ref 0–99)
MICROALBUMIN UR-MCNC: 4.5 UG/ML
POTASSIUM SERPL-SCNC: 4.3 MMOL/L (ref 3.5–5.2)
PROT SERPL-MCNC: 7.4 G/DL (ref 6–8.5)
SODIUM SERPL-SCNC: 141 MMOL/L (ref 134–144)
TRIGL SERPL-MCNC: 67 MG/DL (ref 0–149)
VLDLC SERPL CALC-MCNC: 13 MG/DL (ref 5–40)

## 2019-07-31 ENCOUNTER — OFFICE VISIT (OUTPATIENT)
Dept: ENDOCRINOLOGY | Age: 74
End: 2019-07-31

## 2019-07-31 VITALS
BODY MASS INDEX: 28.98 KG/M2 | HEART RATE: 72 BPM | WEIGHT: 191.2 LBS | HEIGHT: 68 IN | SYSTOLIC BLOOD PRESSURE: 159 MMHG | DIASTOLIC BLOOD PRESSURE: 85 MMHG

## 2019-07-31 DIAGNOSIS — E78.5 HYPERLIPIDEMIA LDL GOAL <100: ICD-10-CM

## 2019-07-31 DIAGNOSIS — I10 ESSENTIAL HYPERTENSION, BENIGN: ICD-10-CM

## 2019-07-31 DIAGNOSIS — E11.21 TYPE 2 DIABETES WITH NEPHROPATHY (HCC): Primary | ICD-10-CM

## 2019-07-31 NOTE — PATIENT INSTRUCTIONS
1) Do your best to have dinner before 7pm if possible to help keep from spiking after dinner. 2) Your liver and kidney and urine and cholesterol are all at goal.    3) Try the losartan for 2 weeks and if you have any side effects or your blood pressure is not staying down under 140/90, then let me know and I'm happy to try a different med in the class. Check to see if valsartan or candesartan or olmesartan or telmisartan are covered under your plan and if you have preference and I'm happy to try a different med.

## 2019-07-31 NOTE — PROGRESS NOTES
Chief Complaint   Patient presents with    Diabetes     pcp and pharmacy confirmed     History of Present Illness: Lenwood Sandhoff is a 68 y.o. male here for follow up of diabetes. Weight up 1 lbs since last visit in 2/19. Did see Dr. Radha White on 6/21/19 for evaluation of cough and was given a 30 day supply of omeprazole 40 mg and took this and did notice some improvement in frequency of cough but it did not go away completely. Saw Dr. Huy Rodriguez in GI yesterday for this and he ordered some x-rays of the neck and chest and is planning for a barium swallow to further evaluate. May need an EGD pending these results. Has checked his sugar after a coughing spell and has seen readings over 200 and once over 300. Still only checking a few times a week and usually gets values in the 130-180s but does have some over 200 the later he eats dinner, which can be after 8pm.  His irbesartan is on backorder with [facility] so I changed him to losartan. He still has about 4-5 tabs left of irbesartan and hasn't switched yet to losartan. He states he read in the pamphlet with this med that losartan may not work as well in [employer] patients and was concerned to take this and I told him that I have never heard this before and have plenty of  patients on this and it works fine so he is agreeable to trying this for 2 weeks and recording some BP readings and will let me know if he has any side effects or if this doesn't control his BP as well and we can change him to a different med in the class. Current Outpatient Medications   Medication Sig    losartan (COZAAR) 100 mg tablet Take 1 Tab by mouth daily. Replaces irbesartan that is on backorder    atorvastatin (LIPITOR) 10 mg tablet Take 1 Tab by mouth daily.  Replaces the 20 mg dose    metFORMIN ER (GLUCOPHAGE XR) 500 mg tablet TAKE 1 TAB BY MOUTH 3 TIMES DAILY    glipiZIDE (GLUCOTROL) 5 mg tablet TAKE 1 TAB BEFORE BREAKFAST AND 2 TABS BEFORE DINNER    INVOKANA 300 mg tablet Take 1/2 tab every other day    ONETOUCH ULTRA BLUE TEST STRIP strip TEST ONCE DAILY    b complex vitamins tablet Take 1 Tab by mouth daily.  Lancets (ONE TOUCH ULTRASOFT LANCETS) Misc by Does Not Apply route. Check blood sugar as needed. No current facility-administered medications for this visit. Allergies   Allergen Reactions    Ace Inhibitors Cough    Cialis [Tadalafil] Other (comments)     Back pain    Onglyza [Saxagliptin] Other (comments)     headache     Review of Systems:  - Eyes: no blurry vision or double vision  - Cardiovascular: no chest pain  - Respiratory: no shortness of breath  - Musculoskeletal: no myalgias  - Neurological: no numbness/tingling in extremities    Physical Examination:  Blood pressure 159/85, pulse 72, height 5' 8\" (1.727 m), weight 191 lb 3.2 oz (86.7 kg). - General: pleasant, no distress, good eye contact   - Neck: no carotid bruits  - Cardiovascular: regular, normal rate, nl s1 and s2, no m/r/g,   - Respiratory: clear bilaterally  - Integumentary: no edema,   - Psychiatric: normal mood and affect    Data Reviewed:   Component      Latest Ref Rng & Units 7/26/2019 7/26/2019 7/26/2019 7/26/2019          11:17 AM 11:17 AM 11:17 AM 11:17 AM   Glucose      65 - 99 mg/dL  152 (H)     BUN      8 - 27 mg/dL  13     Creatinine      0.76 - 1.27 mg/dL  0.94     GFR est non-AA      >59 mL/min/1.73  81     GFR est AA      >59 mL/min/1.73  93     BUN/Creatinine ratio      10 - 24  14     Sodium      134 - 144 mmol/L  141     Potassium      3.5 - 5.2 mmol/L  4.3     Chloride      96 - 106 mmol/L  103     CO2      20 - 29 mmol/L  22     Calcium      8.6 - 10.2 mg/dL  9.2     Protein, total      6.0 - 8.5 g/dL  7.4     Albumin      3.5 - 4.8 g/dL  4.5     GLOBULIN, TOTAL      1.5 - 4.5 g/dL  2.9     A-G Ratio      1.2 - 2.2  1.6     Bilirubin, total      0.0 - 1.2 mg/dL  0.6     Alk.  phosphatase      39 - 117 IU/L  113     AST      0 - 40 IU/L  11     ALT (SGPT)      0 - 44 IU/L  12     Cholesterol, total      100 - 199 mg/dL 123      Triglyceride      0 - 149 mg/dL 67      HDL Cholesterol      >39 mg/dL 32 (L)      VLDL, calculated      5 - 40 mg/dL 13      LDL, calculated      0 - 99 mg/dL 78      Creatinine, urine      Not Estab. mg/dL    41.5   Microalbumin, urine      Not Estab. ug/mL    4.5   Microalbumin/Creat. Ratio      0.0 - 30.0 mg/g creat    10.8   Hemoglobin A1c, (calculated)      4.8 - 5.6 %   8.0 (H)    Estimated average glucose      mg/dL   183      Assessment/Plan:     1. DM w/ Complication: his most recent Hgb A1c was 8% in 7/19 down from 8.1% in 2/19 up from 7.8% in 9/18 down from 8.2% in 3/18 up from 7.3% in 9/17 down from 7.8% in 4/17 up from 7.7% in 11/16 up from 7.2% in 6/16 up from 6.9% in 10/15 stable from 6/15 down from 8.9% in 2/15 up from 7.9% in 10/14 down from 8% in 6/14 up from 7.8% in 2/14 up from 7.4% in 10/13 down from 8.9% in June up from 8.7% in Feb 2013 up from 7.2% in Oct stable from July down from 7.6% in March 2012. A1c is slightly better but still needs to work on diet and try not to eat after 8pm.  - cont metformin  mg 1 tab tid  - cont glipizide 5 mg in am and 10 mg before dinner  - cont invokana 300 mg 1/2 tab every other day  - check bs 1 time per day and focus on post-meal readings  - foot exam done 2/19  - optho UTD 10/18  - microalbumin nl 7/12, up to 49 in 6/13, down to 20 in 10/13, up to 44 in 10/14 so increased diovan to 160 mg daily but still 44 in 2/15. Down to 16 in 6/15 with better A1c but up to 39 in 10/15 and 47 in 6/16. Down to 32 in 11/16. Up to 42 in 9/17 so increased diovan to 320 but up to 68 in 3/18 with higher A1c, down to 19.2 in 9/18 and 10.8 in 7/19 with lower A1c  - check Hgb A1c and bmp prior to next visit      2. Unspecified essential hypertension: his BP was above goal < 140/90 but was at goal at PCP's office in 6/19.   Will be changing to losartan now that irbesartan is on backorder  - begin losartan 100 mg daily when he uses up irbesartan 300 mg daily       3. Other and unspecified hyperlipidemia: Given DM, Goal LDL < 100, non-HDL < 130, and TG < 150. LDL 68 in Feb 2013 and 67 in 10/13 and 66 in 6/14 and 65 in 2/15 and 67 in 10/15 and 71 in 6/16 and 61 in 4/17 on 20 mg of lipitor. I decreased to 10 mg and LDL still 69 in 9/17 and 72 in 9/18 and 78 in 7/19 so will stay on this dose. - cont lipitor 10 mg daily  - check lipids in 7/20          Patient Instructions   1) Do your best to have dinner before 7pm if possible to help keep from spiking after dinner. 2) Your liver and kidney and urine and cholesterol are all at goal.    3) Try the losartan for 2 weeks and if you have any side effects or your blood pressure is not staying down under 140/90, then let me know and I'm happy to try a different med in the class. Check to see if valsartan or candesartan or olmesartan or telmisartan are covered under your plan and if you have preference and I'm happy to try a different med. Follow-up and Dispositions    · Return in about 6 months (around 1/31/2020).                Copy sent to:  Dr. Lillie Carrillo via [facility]

## 2019-09-04 ENCOUNTER — TELEPHONE (OUTPATIENT)
Dept: ENDOCRINOLOGY | Age: 74
End: 2019-09-04

## 2019-09-04 RX ORDER — CANAGLIFLOZIN 300 MG/1
TABLET, FILM COATED ORAL
Qty: 30 TAB | Refills: 11 | Status: SHIPPED | OUTPATIENT
Start: 2019-09-04 | End: 2019-09-10 | Stop reason: CLARIF

## 2019-09-05 NOTE — TELEPHONE ENCOUNTER
Sent him the following message through [employer]:    I had received a fax stating this med (invokana) requires a prior authorization. It appears your insurance prefers you to be on a different med in the same class called farxiga or jardiance but I submitted a prior authorization to see if you can stay on the invokana since you have been on it since Feb 2015. I hope to hear a response in the next few days. If this request is not approved then we will have to try one of the other preferred meds in the same class. I will let you know I will be out of the office starting tonight and won't be back until Monday so if you write back, I'll be in touch when I get back. My partners will be covering for me while I am away if you need anything while I'm gone. Take care.

## 2019-09-10 NOTE — TELEPHONE ENCOUNTER
I have not received a response from his insurance. Can you call [phone] to find out the status of the prior auth for invokana. Thanks.

## 2019-09-10 NOTE — TELEPHONE ENCOUNTER
Per Trevor DOUGLASS the medication was not approved. He stated that there is no clinical reason as to why the patient cannot take the alternative medication . He stated that a peer to peer would be the next step. They must have documentation showing that  Patient has tried and not did well with the farxiga and jardiance.

## 2019-09-10 NOTE — TELEPHONE ENCOUNTER
Sent him the following message through [facility]:    Lupe just spoke with your insurance and apparently they don't send responses via fax and just assumed I would know that the med was not approved if they wanted you to first try either farxiga or jardiance. Given there is good safety data for jardiance to prevent heart disease, I will send 25 mg tabs to your pharmacy. You will take 1/2 tab every other day just like you were taking the invokana even though the bottle will say to take 1 tab daily.

## 2019-10-02 ENCOUNTER — OFFICE VISIT (OUTPATIENT)
Dept: INTERNAL MEDICINE CLINIC | Age: 74
End: 2019-10-02

## 2019-10-02 VITALS
SYSTOLIC BLOOD PRESSURE: 146 MMHG | DIASTOLIC BLOOD PRESSURE: 80 MMHG | HEIGHT: 68 IN | TEMPERATURE: 97.7 F | OXYGEN SATURATION: 97 % | HEART RATE: 69 BPM | RESPIRATION RATE: 20 BRPM | BODY MASS INDEX: 28.98 KG/M2 | WEIGHT: 191.2 LBS

## 2019-10-02 DIAGNOSIS — Z00.00 ROUTINE GENERAL MEDICAL EXAMINATION AT A HEALTH CARE FACILITY: Primary | ICD-10-CM

## 2019-10-02 DIAGNOSIS — E11.21 TYPE 2 DIABETES WITH NEPHROPATHY (HCC): ICD-10-CM

## 2019-10-02 DIAGNOSIS — E78.5 HYPERLIPIDEMIA LDL GOAL <100: ICD-10-CM

## 2019-10-02 DIAGNOSIS — I10 ESSENTIAL HYPERTENSION, BENIGN: ICD-10-CM

## 2019-10-02 DIAGNOSIS — Z12.11 SCREEN FOR COLON CANCER: ICD-10-CM

## 2019-10-02 DIAGNOSIS — Z12.5 SCREENING FOR PROSTATE CANCER: ICD-10-CM

## 2019-10-02 DIAGNOSIS — Z23 ENCOUNTER FOR IMMUNIZATION: ICD-10-CM

## 2019-10-02 DIAGNOSIS — Z79.899 ENCOUNTER FOR LONG-TERM (CURRENT) USE OF OTHER MEDICATIONS: ICD-10-CM

## 2019-10-02 DIAGNOSIS — R07.89 OTHER CHEST PAIN: ICD-10-CM

## 2019-10-02 RX ORDER — ASPIRIN 81 MG/1
81 TABLET ORAL DAILY
Qty: 30 TAB | Refills: 99
Start: 2019-10-02 | End: 2020-02-26

## 2019-10-02 NOTE — PROGRESS NOTES
HISTORY OF PRESENT ILLNESS  Christi Kirby is a 68 y.o. male. HPI Subjective:  Eduin Lima is seen today for a wellness visit and follow up of chronic problems. 1. Medicare wellness visit. See attached note. He is due for select lab studies, Pneumovax and colorectal cancer screening with stool OB. A kit was provided. He is up to date with a TDAP booster, Prevnar vaccine and colonoscopy. 2. Chronic problems are reviewed. Diabetes and elevated cholesterol are followed by endocrinology. 3. New problem reviewed. He had some chest pain a couple of months ago. It resolved, lasted about a week. It was not particularly with exertion, but also was not typical of acid reflux. His EKG is okay. Given his high risk factors, I think the chest pain warrants a stress test.     Review of Systems:  Notable for toe paresthesias, likely this relates to his diabetes. He is also having frequent loose stools, probably three to four per day. I have asked them to discuss with Dr. Autumn Staley, his endocrinologist, whether this may be a side effect from Metformin. Unfortunately his diabetic control is not that good, so I think it will require further consideration to decide on the best approach. We counseled for 15 minutes regarding chest pain and his other medical concerns. This included evaluation of further workup. See above. This component of the visit was 15 minutes and greater than 50% of this component of the visit was spent in counseling. We counseled regarding healthy lifestyle issues including diet, exercise and stress management. Family history, social history, etc. Are reviewed and updated, see electronic record. Review of Systems   Constitutional: Negative for weight loss. Respiratory: Positive for shortness of breath. Cardiovascular: Negative for chest pain, palpitations, leg swelling and PND. Gastrointestinal: Positive for diarrhea. Negative for blood in stool and constipation.    Genitourinary: Negative. Musculoskeletal: Negative for myalgias. Neurological: Negative for focal weakness. Physical Exam   Constitutional: He is oriented to person, place, and time. He appears well-developed and well-nourished. No distress. HENT:   Head: Normocephalic and atraumatic. Right Ear: Tympanic membrane, external ear and ear canal normal.   Left Ear: Tympanic membrane, external ear and ear canal normal.   Eyes: Pupils are equal, round, and reactive to light. EOM are normal. Right eye exhibits no discharge. Left eye exhibits no discharge. Neck: Normal range of motion. Neck supple. Carotid bruit is not present. No thyromegaly present. Cardiovascular: Normal rate, regular rhythm, normal heart sounds and intact distal pulses. Exam reveals no gallop and no friction rub. No murmur heard. Pulmonary/Chest: Effort normal and breath sounds normal. No respiratory distress. He has no wheezes. He has no rales. Abdominal: Soft. Bowel sounds are normal. He exhibits no distension and no mass. There is no tenderness. There is no rebound and no guarding. Musculoskeletal: Normal range of motion. He exhibits no edema or tenderness. Lymphadenopathy:     He has no cervical adenopathy. Neurological: He is alert and oriented to person, place, and time. He has normal reflexes. Skin: Skin is warm and dry. No rash noted. Psychiatric: He has a normal mood and affect. His behavior is normal.   Nursing note and vitals reviewed. ASSESSMENT and PLAN  Diagnoses and all orders for this visit:    1. Routine general medical examination at a health care facility  -     CBC WITH AUTOMATED DIFF  -     TSH 3RD GENERATION  -     URINALYSIS W/ RFLX MICROSCOPIC  -     PSA SCREENING (SCREENING)    2. Hyperlipidemia LDL goal <100- See endocrinologist as directed. 3. Encounter for long-term (current) use of other medications    4.  Essential hypertension, benign- Continue current regimen of prescription and / or OTC medications     5. Type 2 diabetes with nephropathy (HCC)  -     aspirin delayed-release 81 mg tablet; Take 1 Tab by mouth daily. 6. Screening for prostate cancer    7. Screen for colon cancer  -     OCCULT BLOOD IMMUNOASSAY,DIAGNOSTIC    8.  Encounter for immunization  -     PNEUMOCOCCAL POLYSACCHARIDE VACCINE, 23-VALENT, ADULT OR IMMUNOSUPPRESSED PT DOSE,    9. Other chest pain  -     AMB POC EKG ROUTINE W/ 12 LEADS, INTER & REP  -     NUCLEAR CARDIAC STRESS TEST; Future

## 2019-10-03 LAB
APPEARANCE UR: CLEAR
BASOPHILS # BLD AUTO: 0 X10E3/UL (ref 0–0.2)
BASOPHILS NFR BLD AUTO: 1 %
BILIRUB UR QL STRIP: NEGATIVE
COLOR UR: YELLOW
EOSINOPHIL # BLD AUTO: 0.1 X10E3/UL (ref 0–0.4)
EOSINOPHIL NFR BLD AUTO: 1 %
ERYTHROCYTE [DISTWIDTH] IN BLOOD BY AUTOMATED COUNT: 13.8 % (ref 12.3–15.4)
GLUCOSE UR QL: ABNORMAL
HCT VFR BLD AUTO: 45.1 % (ref 37.5–51)
HGB BLD-MCNC: 14.8 G/DL (ref 13–17.7)
HGB UR QL STRIP: NEGATIVE
IMM GRANULOCYTES # BLD AUTO: 0 X10E3/UL (ref 0–0.1)
IMM GRANULOCYTES NFR BLD AUTO: 0 %
KETONES UR QL STRIP: NEGATIVE
LEUKOCYTE ESTERASE UR QL STRIP: NEGATIVE
LYMPHOCYTES # BLD AUTO: 1.5 X10E3/UL (ref 0.7–3.1)
LYMPHOCYTES NFR BLD AUTO: 20 %
MCH RBC QN AUTO: 29.3 PG (ref 26.6–33)
MCHC RBC AUTO-ENTMCNC: 32.8 G/DL (ref 31.5–35.7)
MCV RBC AUTO: 89 FL (ref 79–97)
MICRO URNS: ABNORMAL
MONOCYTES # BLD AUTO: 0.6 X10E3/UL (ref 0.1–0.9)
MONOCYTES NFR BLD AUTO: 8 %
NEUTROPHILS # BLD AUTO: 5 X10E3/UL (ref 1.4–7)
NEUTROPHILS NFR BLD AUTO: 70 %
NITRITE UR QL STRIP: NEGATIVE
PH UR STRIP: 5.5 [PH] (ref 5–7.5)
PLATELET # BLD AUTO: 196 X10E3/UL (ref 150–450)
PROT UR QL STRIP: NEGATIVE
PSA SERPL-MCNC: 1.4 NG/ML (ref 0–4)
RBC # BLD AUTO: 5.05 X10E6/UL (ref 4.14–5.8)
SP GR UR: 1.02 (ref 1–1.03)
TSH SERPL DL<=0.005 MIU/L-ACNC: 1.31 UIU/ML (ref 0.45–4.5)
UROBILINOGEN UR STRIP-MCNC: 0.2 MG/DL (ref 0.2–1)
WBC # BLD AUTO: 7.3 X10E3/UL (ref 3.4–10.8)

## 2019-10-04 ENCOUNTER — HOSPITAL ENCOUNTER (OUTPATIENT)
Dept: NON INVASIVE DIAGNOSTICS | Age: 74
Discharge: HOME | End: 2019-10-04
Attending: INTERNAL MEDICINE
Payer: COMMERCIAL

## 2019-10-04 ENCOUNTER — HOSPITAL ENCOUNTER (OUTPATIENT)
Dept: NUCLEAR MEDICINE | Age: 74
Discharge: HOME | End: 2019-10-04
Attending: INTERNAL MEDICINE
Payer: COMMERCIAL

## 2019-10-04 ENCOUNTER — TELEPHONE (OUTPATIENT)
Dept: INTERNAL MEDICINE CLINIC | Age: 74
End: 2019-10-04

## 2019-10-04 DIAGNOSIS — R07.89 OTHER CHEST PAIN: ICD-10-CM

## 2019-10-04 LAB
STRESS BASELINE HR: 54 BPM
STRESS ESTIMATED WORKLOAD: 1 METS
STRESS EXERCISE DUR MIN: NORMAL
STRESS PEAK DIAS BP: 85 MMHG
STRESS PEAK SYS BP: 142 MMHG
STRESS PERCENT HR ACHIEVED: 59 %
STRESS POST PEAK HR: 88 BPM
STRESS RATE PRESSURE PRODUCT: NORMAL BPM*MMHG
STRESS TARGET HR: 148 BPM

## 2019-10-04 PROCEDURE — A9500 TC99M SESTAMIBI: HCPCS

## 2019-10-04 PROCEDURE — 93017 CV STRESS TEST TRACING ONLY: CPT

## 2019-10-04 PROCEDURE — 74011250636 HC RX REV CODE- 250/636: Performed by: INTERNAL MEDICINE

## 2019-10-04 RX ORDER — SODIUM CHLORIDE 0.9 % (FLUSH) 0.9 %
10 SYRINGE (ML) INJECTION AS NEEDED
Status: DISCONTINUED | OUTPATIENT
Start: 2019-10-04 | End: 2019-10-08 | Stop reason: HOSPADM

## 2019-10-04 RX ADMIN — Medication 10 ML: at 10:45

## 2019-10-04 RX ADMIN — REGADENOSON 0.4 MG: 0.08 INJECTION, SOLUTION INTRAVENOUS at 10:45

## 2019-12-19 ENCOUNTER — OFFICE VISIT (OUTPATIENT)
Dept: INTERNAL MEDICINE CLINIC | Age: 74
End: 2019-12-19

## 2019-12-19 VITALS
RESPIRATION RATE: 20 BRPM | DIASTOLIC BLOOD PRESSURE: 77 MMHG | TEMPERATURE: 98.2 F | WEIGHT: 190.4 LBS | OXYGEN SATURATION: 97 % | SYSTOLIC BLOOD PRESSURE: 117 MMHG | HEIGHT: 68 IN | BODY MASS INDEX: 28.85 KG/M2 | HEART RATE: 77 BPM

## 2019-12-19 DIAGNOSIS — R05.3 CHRONIC COUGH: ICD-10-CM

## 2019-12-19 DIAGNOSIS — J20.8 ACUTE BACTERIAL BRONCHITIS: Primary | ICD-10-CM

## 2019-12-19 DIAGNOSIS — B96.89 ACUTE BACTERIAL BRONCHITIS: Primary | ICD-10-CM

## 2019-12-19 RX ORDER — CEFUROXIME AXETIL 500 MG/1
500 TABLET ORAL 2 TIMES DAILY
Qty: 20 TAB | Refills: 0 | Status: SHIPPED | OUTPATIENT
Start: 2019-12-19 | End: 2019-12-29

## 2019-12-19 RX ORDER — BUDESONIDE AND FORMOTEROL FUMARATE DIHYDRATE 80; 4.5 UG/1; UG/1
2 AEROSOL RESPIRATORY (INHALATION) 2 TIMES DAILY
Qty: 1 INHALER | Refills: 0 | Status: SHIPPED | COMMUNITY
Start: 2019-12-19 | End: 2020-01-16 | Stop reason: ALTCHOICE

## 2019-12-19 NOTE — PROGRESS NOTES
Pt has had nasal congestion and cough x3 weeks. Tried Diabetic Tussin (2 bottles) - without relief. Was seen at [facility] on 12/8/19 - rx for possible pneumonia with Doxycycline 100 mg q12 hrs. He has completed. No fever. Felt warm. Here for further evaluation.

## 2019-12-19 NOTE — PROGRESS NOTES
HISTORY OF PRESENT ILLNESS  Adelita Gurrola is a 69 y.o. male. URI    The history is provided by the patient and spouse. This is a new problem. Episode onset: 3 wks. The problem has not changed since onset. Patient reports a subjective fever - was not measured. Associated symptoms include congestion, headaches, rhinorrhea and cough. Pertinent negatives include no abdominal pain, no diarrhea, no nausea and no vomiting. Treatments tried: doxycyline from  12/09. The treatment provided no relief. Review of Systems   HENT: Positive for congestion and rhinorrhea. Respiratory: Positive for cough and sputum production. Gastrointestinal: Negative for abdominal pain, diarrhea, nausea and vomiting. Neurological: Positive for headaches. Physical Exam  Vitals signs and nursing note reviewed. Constitutional:       General: He is not in acute distress. HENT:      Right Ear: Tympanic membrane and ear canal normal.      Left Ear: Tympanic membrane and ear canal normal.      Nose:      Right Sinus: No maxillary sinus tenderness or frontal sinus tenderness. Left Sinus: No maxillary sinus tenderness or frontal sinus tenderness. Mouth/Throat:      Pharynx: Posterior oropharyngeal erythema present. No oropharyngeal exudate. Eyes:      Conjunctiva/sclera: Conjunctivae normal.   Neck:      Musculoskeletal: Neck supple. No muscular tenderness. Cardiovascular:      Rate and Rhythm: Normal rate and regular rhythm. Heart sounds: No murmur. No friction rub. No gallop. Pulmonary:      Effort: Pulmonary effort is normal.      Breath sounds: Wheezing present. No rales. Comments: Mild with forced expiration  Lymphadenopathy:      Cervical: No cervical adenopathy. ASSESSMENT and PLAN  Diagnoses and all orders for this visit:    1. Acute bacterial bronchitis  -     cefUROXime (CEFTIN) 500 mg tablet;  Take 1 Tab by mouth two (2) times a day for 10 days.  -     guaiFENesin-dextromethorphan SR (MUCINEX DM) 600-30 mg per tablet; Take 1 Tab by mouth two (2) times a day for 10 days. -     XR CHEST PA LAT; Future  -     budesonide-formoterol (SYMBICORT) 80-4.5 mcg/actuation HFAA; Take 2 Puffs by inhalation two (2) times a day. 2. Chronic cough  -     XR CHEST PA LAT; Future. One month, needs to get 2 outside films for comparison  -     budesonide-formoterol (SYMBICORT) 80-4.5 mcg/actuation HFAA; Take 2 Puffs by inhalation two (2) times a day.     Plan was reviewed with patient and family, understanding expressed

## 2020-01-13 ENCOUNTER — HOSPITAL ENCOUNTER (OUTPATIENT)
Dept: GENERAL RADIOLOGY | Age: 75
Discharge: HOME | End: 2020-01-13
Attending: INTERNAL MEDICINE
Payer: MEDICARE

## 2020-01-13 DIAGNOSIS — J20.8 ACUTE BACTERIAL BRONCHITIS: ICD-10-CM

## 2020-01-13 DIAGNOSIS — R05.3 CHRONIC COUGH: ICD-10-CM

## 2020-01-13 DIAGNOSIS — B96.89 ACUTE BACTERIAL BRONCHITIS: ICD-10-CM

## 2020-01-13 PROCEDURE — 71046 X-RAY EXAM CHEST 2 VIEWS: CPT

## 2020-01-13 NOTE — TELEPHONE ENCOUNTER
----- Message from Jeffery Saldaña sent at 1/13/2020  4:20 PM EST -----  Regarding: Prescription Question  Contact: [phone]  Please send a new prescription for Jardiance 90 day supply to [facility] mail order. Their fax number is [phone]. It is cheaper because [facility] pharmacy is charging $102.88 for a 30 day supply. My insurance said to get it through the mail order.     Thanks,  Peter Ohara

## 2020-01-16 ENCOUNTER — OFFICE VISIT (OUTPATIENT)
Dept: INTERNAL MEDICINE CLINIC | Age: 75
End: 2020-01-16

## 2020-01-16 VITALS
RESPIRATION RATE: 16 BRPM | BODY MASS INDEX: 29.04 KG/M2 | OXYGEN SATURATION: 98 % | DIASTOLIC BLOOD PRESSURE: 79 MMHG | HEART RATE: 71 BPM | SYSTOLIC BLOOD PRESSURE: 130 MMHG | HEIGHT: 68 IN | WEIGHT: 191.6 LBS | TEMPERATURE: 97.8 F

## 2020-01-16 DIAGNOSIS — R05.3 CHRONIC COUGH: Primary | ICD-10-CM

## 2020-01-16 DIAGNOSIS — J20.9 ACUTE BRONCHITIS, UNSPECIFIED ORGANISM: ICD-10-CM

## 2020-01-16 NOTE — PROGRESS NOTES
HISTORY OF PRESENT ILLNESS  Jose Rafael Jacobs is a 69 y.o. male. URI    The history is provided by the patient. This is a new problem. The current episode started more than 1 week ago. The problem has been resolved. There has been no fever. Associated symptoms include cough. Pertinent negatives include no wheezing. Treatments tried: abx, symbicort. Cough   The history is provided by the patient. This is a chronic problem. The problem occurs daily. Progression since onset: very slight improvement. Treatments tried: cxr normal.       Review of Systems   Respiratory: Positive for cough. Negative for wheezing. Physical Exam  Vitals signs and nursing note reviewed. Constitutional:       General: He is not in acute distress. Cardiovascular:      Rate and Rhythm: Normal rate and regular rhythm. Heart sounds: No murmur. No friction rub. No gallop. Pulmonary:      Effort: Pulmonary effort is normal.      Breath sounds: Normal breath sounds. No wheezing or rales. ASSESSMENT and PLAN  Diagnoses and all orders for this visit:    1. Chronic cough  -     REFERRAL TO PULMONARY DISEASE    2. Acute bronchitis, unspecified organism- Call with recurrence     Other orders  -     fluticasone propion-salmeteroL (ADVAIR HFA) 115-21 mcg/actuation inhaler; Take 2 Puffs by inhalation two (2) times a day.

## 2020-02-04 LAB
BUN SERPL-MCNC: 9 MG/DL (ref 8–27)
BUN/CREAT SERPL: 10 (ref 10–24)
CALCIUM SERPL-MCNC: 9.2 MG/DL (ref 8.6–10.2)
CHLORIDE SERPL-SCNC: 107 MMOL/L (ref 96–106)
CO2 SERPL-SCNC: 20 MMOL/L (ref 20–29)
CREAT SERPL-MCNC: 0.88 MG/DL (ref 0.76–1.27)
EST. AVERAGE GLUCOSE BLD GHB EST-MCNC: 189 MG/DL
GLUCOSE SERPL-MCNC: 180 MG/DL (ref 65–99)
HBA1C MFR BLD: 8.2 % (ref 4.8–5.6)
POTASSIUM SERPL-SCNC: 4 MMOL/L (ref 3.5–5.2)
SODIUM SERPL-SCNC: 142 MMOL/L (ref 134–144)

## 2020-02-19 RX ORDER — GLIPIZIDE 5 MG/1
TABLET ORAL
Qty: 270 TAB | Refills: 3 | Status: SHIPPED | OUTPATIENT
Start: 2020-02-19 | End: 2021-02-07 | Stop reason: SDUPTHER

## 2020-02-26 ENCOUNTER — OFFICE VISIT (OUTPATIENT)
Dept: ENDOCRINOLOGY | Age: 75
End: 2020-02-26

## 2020-02-26 VITALS
HEIGHT: 68 IN | BODY MASS INDEX: 29.13 KG/M2 | SYSTOLIC BLOOD PRESSURE: 142 MMHG | HEART RATE: 74 BPM | WEIGHT: 192.2 LBS | DIASTOLIC BLOOD PRESSURE: 70 MMHG

## 2020-02-26 DIAGNOSIS — E78.5 HYPERLIPIDEMIA LDL GOAL <100: ICD-10-CM

## 2020-02-26 DIAGNOSIS — E11.21 TYPE 2 DIABETES WITH NEPHROPATHY (HCC): Primary | ICD-10-CM

## 2020-02-26 DIAGNOSIS — I10 ESSENTIAL HYPERTENSION, BENIGN: ICD-10-CM

## 2020-02-26 RX ORDER — OMEPRAZOLE 20 MG/1
CAPSULE, DELAYED RELEASE ORAL
COMMUNITY
Start: 2020-02-13 | End: 2021-06-23

## 2020-02-26 NOTE — PATIENT INSTRUCTIONS
1) In the future, if you are sick and need antibiotics, plan on taking 2 of the glipizide before breakfast and dinner to avoid spikes from sickness. 2) Your A1c yonatan from 8% to 8.2% possibly due to infection over the winter but we have not had a value under 8% in 12 months. 3) We will combine jardiance and metformin into a pill called synjardy XR 12.5/1000 mg and you will take 1 tab with breakfast and 1 tab with dinner. I sent this to [facility] today. 4) Over the next week, plan on taking a whole 25 mg jardiance everyday until the new supply arrives and 2 of the metformin tabs twice daily which is an equivalent dose of the new synjardy. 5) Check blood sugars once daily either fasting (goal is ) or 2 hours after a meal (goal is less than 180). Alternate one meal a day to check (example: one day check after breakfast, one day after lunch, one day after dinner). Write down what you ate if your blood sugar is more than 180 so you can know to cut back or cut out this food from your diet. 6) Start monitoring blood pressure about 2-3 times per week at alternating times either in the morning or evening after resting for 5 minutes and sitting upright in a chair with your arm at heart level. Please let me know if you are having readings over 140 on the top number or 90 on the bottom number after 2 weeks of starting the synjardy.

## 2020-02-26 NOTE — PROGRESS NOTES
Chief Complaint   Patient presents with    Diabetes     pcp and pharmacy confirmed     History of Present Illness: Enmanuel Gordon is a 69 y.o. male here for follow up of diabetes. Weight up 1 lbs since last visit in 7/19. Has been off the irbesartan and on losartan 100 mg daily. Had has 2 visits for bronchitis in 12/19 and 1/20 and BP readings at those visits were good. Received abx in 12/19 but never needed any oral steroids. Switched from [place] to jardiance in 9/19 due to insurance and has been taking 1/2 tab every other day per my direction even though the bottle says one tab daily. Compliant with glipizide and metformin and may miss 1-2 doses per month. Did see some fasting sugars in the 210-220s when dealing with bronchitis. When not sick has dropped to the low 140s fasting. Hasn't checked any after meal readings. Compliant with lipitor. Current Outpatient Medications   Medication Sig    omeprazole (PRILOSEC) 20 mg capsule TAKE 1 CAPSULE BY MOUTH EVERY DAY    glipiZIDE (GLUCOTROL) 5 mg tablet TAKE 1 TAB BEFORE BREAKFAST AND 2 TABS BEFORE DINNER    empagliflozin (JARDIANCE) 25 mg tablet Take 1 tab daily    losartan (COZAAR) 100 mg tablet Take 1 Tab by mouth daily. Replaces irbesartan that is on backorder    atorvastatin (LIPITOR) 10 mg tablet Take 1 Tab by mouth daily. Replaces the 20 mg dose    metFORMIN ER (GLUCOPHAGE XR) 500 mg tablet TAKE 1 TAB BY MOUTH 3 TIMES DAILY    ONETOUCH ULTRA BLUE TEST STRIP strip TEST ONCE DAILY    b complex vitamins tablet Take 1 Tab by mouth daily.  Lancets (ONE TOUCH ULTRASOFT LANCETS) Misc by Does Not Apply route. Check blood sugar as needed. No current facility-administered medications for this visit.       Allergies   Allergen Reactions    Ace Inhibitors Cough    Cialis [Tadalafil] Other (comments)     Back pain    Onglyza [Saxagliptin] Other (comments)     headache     Review of Systems:  - Eyes: no blurry vision or double vision  - Cardiovascular: no chest pain  - Respiratory: no shortness of breath  - Musculoskeletal: no myalgias  - Neurological: stable numbness/tingling in extremities    Physical Examination:  Blood pressure 142/70, pulse 74, height 5' 8\" (1.727 m), weight 192 lb 3.2 oz (87.2 kg). - General: pleasant, no distress, good eye contact   - Neck: no carotid bruits  - Cardiovascular: regular, normal rate, nl s1 and s2, no m/r/g,   - Respiratory: clear bilaterally  - Integumentary: no edema,   - Psychiatric: normal mood and affect    Diabetic foot exam:     Left Foot:   Visual Exam: callous - mild   Pulse DP: 2+ (normal)   Filament test: normal sensation    Vibratory sensation: diminished      Right Foot:   Visual Exam: callous - mild   Pulse DP: 2+ (normal)   Filament test: normal sensation    Vibratory sensation: diminished        Data Reviewed:   Component      Latest Ref Rng & Units 2/3/2020 2/3/2020          12:44 PM 12:44 PM   Glucose      65 - 99 mg/dL  180 (H)   BUN      8 - 27 mg/dL  9   Creatinine      0.76 - 1.27 mg/dL  0.88   GFR est non-AA      >59 mL/min/1.73  85   GFR est AA      >59 mL/min/1.73  99   BUN/Creatinine ratio      10 - 24  10   Sodium      134 - 144 mmol/L  142   Potassium      3.5 - 5.2 mmol/L  4.0   Chloride      96 - 106 mmol/L  107 (H)   CO2      20 - 29 mmol/L  20   Calcium      8.6 - 10.2 mg/dL  9.2   Hemoglobin A1c, (calculated)      4.8 - 5.6 % 8.2 (H)    Estimated average glucose      mg/dL 189        Assessment/Plan:     1.  DM w/ Complication: his most recent Hgb A1c was 8.2% in 2/20 up from 8% in 7/19 down from 8.1% in 2/19 up from 7.8% in 9/18 down from 8.2% in 3/18 up from 7.3% in 9/17 down from 7.8% in 4/17 up from 7.7% in 11/16 up from 7.2% in 6/16 up from 6.9% in 10/15 stable from 6/15 down from 8.9% in 2/15 up from 7.9% in 10/14 down from 8% in 6/14 up from 7.8% in 2/14 up from 7.4% in 10/13 down from 8.9% in June up from 8.7% in Feb 2013 up from 7.2% in Oct stable from July down from 7.6% in March 2012. A1c remains above 8% so will combine jardiance and metformin and max out doses of both. - change to synjardy XR 12.5/1000 mg 1 tab bid  - use up metformin  mg taking 2 tabs bid  - cont glipizide 5 mg in am and 10 mg before dinner  - use up jardiance 25 mg taking 1 tab daily  - check bs 1 time per day and focus on post-meal readings  - foot exam done 2/20  - optho UTD 10/19--will obtain report  - microalbumin nl 7/12, up to 49 in 6/13, down to 20 in 10/13, up to 44 in 10/14 so increased diovan to 160 mg daily but still 44 in 2/15. Down to 16 in 6/15 with better A1c but up to 39 in 10/15 and 47 in 6/16. Down to 32 in 11/16. Up to 42 in 9/17 so increased diovan to 320 but up to 68 in 3/18 with higher A1c, down to 19.2 in 9/18 and 10.8 in 7/19 with lower A1c  - check Hgb A1c and cmp and microalbumin prior to next visit      2. Unspecified essential hypertension: his BP was above goal < 140/90 but was at goal at PCP's office in 12/19 and 1/20. Higher dose of jardiance should help. - cont losartan 100 mg daily   - monitor home blood pressure readings      3. Other and unspecified hyperlipidemia: Given DM, Goal LDL < 100, non-HDL < 130, and TG < 150. LDL 68 in Feb 2013 and 67 in 10/13 and 66 in 6/14 and 65 in 2/15 and 67 in 10/15 and 71 in 6/16 and 61 in 4/17 on 20 mg of lipitor. I decreased to 10 mg and LDL still 69 in 9/17 and 72 in 9/18 and 78 in 7/19 so will stay on this dose. - cont lipitor 10 mg daily  - check lipids prior to next visit            Patient Instructions   1) In the future, if you are sick and need antibiotics, plan on taking 2 of the glipizide before breakfast and dinner to avoid spikes from sickness. 2) Your A1c yonatan from 8% to 8.2% possibly due to infection over the winter but we have not had a value under 8% in 12 months.     3) We will combine jardiance and metformin into a pill called synjardy XR 12.5/1000 mg and you will take 1 tab with breakfast and 1 tab with dinner. I sent this to [facility] today. 4) Over the next week, plan on taking a whole 25 mg jardiance everyday until the new supply arrives and 2 of the metformin tabs twice daily which is an equivalent dose of the new synjardy. 5) Check blood sugars once daily either fasting (goal is ) or 2 hours after a meal (goal is less than 180). Alternate one meal a day to check (example: one day check after breakfast, one day after lunch, one day after dinner). Write down what you ate if your blood sugar is more than 180 so you can know to cut back or cut out this food from your diet. 6) Start monitoring blood pressure about 2-3 times per week at alternating times either in the morning or evening after resting for 5 minutes and sitting upright in a chair with your arm at heart level. Please let me know if you are having readings over 140 on the top number or 90 on the bottom number after 2 weeks of starting the synjardy. Follow-up and Dispositions    · Return in about 6 months (around 8/26/2020).                Copy sent to:  Dr. Pipo Rod via [employer]

## 2020-04-28 RX ORDER — ATORVASTATIN CALCIUM 10 MG/1
TABLET, FILM COATED ORAL
Qty: 90 TAB | Refills: 3 | Status: SHIPPED | OUTPATIENT
Start: 2020-04-28 | End: 2021-04-28 | Stop reason: SDUPTHER

## 2020-10-18 RX ORDER — LOSARTAN POTASSIUM 100 MG/1
100 TABLET ORAL DAILY
Qty: 90 TAB | Refills: 0 | Status: SHIPPED | OUTPATIENT
Start: 2020-10-18 | End: 2021-01-25 | Stop reason: SDUPTHER

## 2020-11-06 ENCOUNTER — TELEPHONE (OUTPATIENT)
Dept: ENDOCRINOLOGY | Age: 75
End: 2020-11-06

## 2020-11-06 DIAGNOSIS — E11.21 TYPE 2 DIABETES WITH NEPHROPATHY (HCC): Primary | ICD-10-CM

## 2020-11-06 DIAGNOSIS — I10 ESSENTIAL HYPERTENSION, BENIGN: ICD-10-CM

## 2020-11-06 DIAGNOSIS — E78.5 HYPERLIPIDEMIA LDL GOAL <100: ICD-10-CM

## 2020-11-06 NOTE — TELEPHONE ENCOUNTER
The next in person visit I have at [facility] is 1/6/21 at 12:10pm.  Please let me know if he can take this and I can order labs to be done before this visit.

## 2020-11-06 NOTE — TELEPHONE ENCOUNTER
----- Message from Delores Flower sent at 11/6/2020  3:13 PM EST -----  Regarding: Dr Ricketts Pulse Message/Vendor Calls    Caller's first and last name: Meri Willingham, spouse      Reason for call: Jaquelin Ang is requesting to schedule an in office visit for pt to be seen for follow up for diabetes      Callback required yes/no and why: yes      Best contact number(s): [phone]      Details to clarify the request:      Delores Flower

## 2020-11-09 NOTE — TELEPHONE ENCOUNTER
11/9/2020  2:04 PM      Scheduled the appointment listed below. Also stated labs are needed before visit.

## 2020-11-09 NOTE — TELEPHONE ENCOUNTER
Sent him the following message through [employer]:    I understand you accepted the next in-person visit that I have available for January. I put an order directly into the [facility] system to repeat your labs in the 1-2 weeks prior to your next visit so just ask for the order under my name and you will receive a courtesy reminder through [employer] to have these drawn. Please fast for your labs. Don't eat anything after midnight. However, drink at least 6-8 oz of water the morning of the lab draw to avoid dehydration and to allow for the tech to find your vein easier. Be prepared to give a urine sample to test for any effect of the diabetes on your kidneys.

## 2020-12-16 RX ORDER — EMPAGLIFLOZIN, METFORMIN HYDROCHLORIDE 12.5; 1 MG/1; MG/1
TABLET, EXTENDED RELEASE ORAL
Qty: 28 EACH | Refills: 0 | Status: SHIPPED | OUTPATIENT
Start: 2020-12-16 | End: 2021-03-02 | Stop reason: SDUPTHER

## 2020-12-23 DIAGNOSIS — I10 ESSENTIAL HYPERTENSION, BENIGN: ICD-10-CM

## 2020-12-23 DIAGNOSIS — E11.21 TYPE 2 DIABETES WITH NEPHROPATHY (HCC): ICD-10-CM

## 2020-12-23 DIAGNOSIS — E78.5 HYPERLIPIDEMIA LDL GOAL <100: ICD-10-CM

## 2020-12-30 LAB
ALBUMIN SERPL-MCNC: 4.7 G/DL (ref 3.7–4.7)
ALBUMIN/CREAT UR: 38 MG/G CREAT (ref 0–29)
ALBUMIN/GLOB SERPL: 1.6 {RATIO} (ref 1.2–2.2)
ALP SERPL-CCNC: 133 IU/L (ref 39–117)
ALT SERPL-CCNC: 10 IU/L (ref 0–44)
AST SERPL-CCNC: 12 IU/L (ref 0–40)
BILIRUB SERPL-MCNC: 0.6 MG/DL (ref 0–1.2)
BUN SERPL-MCNC: 10 MG/DL (ref 8–27)
BUN/CREAT SERPL: 10 (ref 10–24)
CALCIUM SERPL-MCNC: 9.4 MG/DL (ref 8.6–10.2)
CHLORIDE SERPL-SCNC: 107 MMOL/L (ref 96–106)
CHOLEST SERPL-MCNC: 125 MG/DL (ref 100–199)
CO2 SERPL-SCNC: 23 MMOL/L (ref 20–29)
CREAT SERPL-MCNC: 0.97 MG/DL (ref 0.76–1.27)
CREAT UR-MCNC: 50.5 MG/DL
EST. AVERAGE GLUCOSE BLD GHB EST-MCNC: 154 MG/DL
GLOBULIN SER CALC-MCNC: 3 G/DL (ref 1.5–4.5)
GLUCOSE SERPL-MCNC: 122 MG/DL (ref 65–99)
HBA1C MFR BLD: 7 % (ref 4.8–5.6)
HDLC SERPL-MCNC: 32 MG/DL
INTERPRETATION, 910389: NORMAL
LDLC SERPL CALC-MCNC: 77 MG/DL (ref 0–99)
Lab: NORMAL
MICROALBUMIN UR-MCNC: 19 UG/ML
POTASSIUM SERPL-SCNC: 4.6 MMOL/L (ref 3.5–5.2)
PROT SERPL-MCNC: 7.7 G/DL (ref 6–8.5)
SODIUM SERPL-SCNC: 144 MMOL/L (ref 134–144)
TRIGL SERPL-MCNC: 78 MG/DL (ref 0–149)
VLDLC SERPL CALC-MCNC: 16 MG/DL (ref 5–40)

## 2021-01-06 ENCOUNTER — OFFICE VISIT (OUTPATIENT)
Dept: ENDOCRINOLOGY | Age: 76
End: 2021-01-06
Payer: COMMERCIAL

## 2021-01-06 VITALS — DIASTOLIC BLOOD PRESSURE: 80 MMHG | SYSTOLIC BLOOD PRESSURE: 122 MMHG

## 2021-01-06 DIAGNOSIS — I10 ESSENTIAL HYPERTENSION, BENIGN: ICD-10-CM

## 2021-01-06 DIAGNOSIS — E78.5 HYPERLIPIDEMIA LDL GOAL <100: ICD-10-CM

## 2021-01-06 DIAGNOSIS — E11.21 TYPE 2 DIABETES WITH NEPHROPATHY (HCC): Primary | ICD-10-CM

## 2021-01-06 PROCEDURE — 2022F DILAT RTA XM EVC RTNOPTHY: CPT | Performed by: INTERNAL MEDICINE

## 2021-01-06 PROCEDURE — 3046F HEMOGLOBIN A1C LEVEL >9.0%: CPT | Performed by: INTERNAL MEDICINE

## 2021-01-06 PROCEDURE — 1101F PT FALLS ASSESS-DOCD LE1/YR: CPT | Performed by: INTERNAL MEDICINE

## 2021-01-06 PROCEDURE — G8752 SYS BP LESS 140: HCPCS | Performed by: INTERNAL MEDICINE

## 2021-01-06 PROCEDURE — G8432 DEP SCR NOT DOC, RNG: HCPCS | Performed by: INTERNAL MEDICINE

## 2021-01-06 PROCEDURE — 99214 OFFICE O/P EST MOD 30 MIN: CPT | Performed by: INTERNAL MEDICINE

## 2021-01-06 PROCEDURE — G8536 NO DOC ELDER MAL SCRN: HCPCS | Performed by: INTERNAL MEDICINE

## 2021-01-06 PROCEDURE — G8427 DOCREV CUR MEDS BY ELIG CLIN: HCPCS | Performed by: INTERNAL MEDICINE

## 2021-01-06 PROCEDURE — G8419 CALC BMI OUT NRM PARAM NOF/U: HCPCS | Performed by: INTERNAL MEDICINE

## 2021-01-06 PROCEDURE — 3017F COLORECTAL CA SCREEN DOC REV: CPT | Performed by: INTERNAL MEDICINE

## 2021-01-06 PROCEDURE — G8754 DIAS BP LESS 90: HCPCS | Performed by: INTERNAL MEDICINE

## 2021-01-06 NOTE — PROGRESS NOTES
Chief Complaint   Patient presents with    Diabetes     pcp and pharmacy confirmed     History of Present Illness: Destini Kimble is a 77 y.o. male here for follow up of diabetes. No major change to health since last visit in 2/20. No dysuria or hematuria. Compliant with synjardy and glipizide and fasting sugar was 155 this morning but normally is closer to the 140s but does tend to drop during the day with more activity but not under 80. BP at home is mostly under 140 on the losartan. Compliant with atorvastatin. Does have some numbness in his feet at night despite taking the b-complex vitamin but not so severe that he wants to try medication. Current Outpatient Medications   Medication Sig    empagliflozin-metformin (Synjardy XR) 12.5-1,000 mg TBph Take 1 tab twice daily--14 day supply until mail order arrives    losartan (COZAAR) 100 mg tablet Take 1 Tab by mouth daily. Replaces irbesartan that is on backorder--Needs follow up appointment for further refills    atorvastatin (LIPITOR) 10 mg tablet TAKE 1 TABLET DAILY        (REPLACES THE 20MG DOSE)    glipiZIDE (GLUCOTROL) 5 mg tablet TAKE 1 TAB BEFORE BREAKFAST AND 2 TABS BEFORE DINNER    ONETOUCH ULTRA BLUE TEST STRIP strip TEST ONCE DAILY    b complex vitamins tablet Take 1 Tab by mouth daily.  Lancets (ONE TOUCH ULTRASOFT LANCETS) Misc by Does Not Apply route. Check blood sugar as needed.  omeprazole (PRILOSEC) 20 mg capsule TAKE 1 CAPSULE BY MOUTH EVERY DAY     No current facility-administered medications for this visit.       Allergies   Allergen Reactions    Ace Inhibitors Cough    Cialis [Tadalafil] Other (comments)     Back pain    Onglyza [Saxagliptin] Other (comments)     headache     Review of Systems:  - Eyes: occ blurry vision, no double vision  - Cardiovascular: no chest pain  - Respiratory: no shortness of breath  - Musculoskeletal: no myalgias  - Neurological: (+) numbness/tingling in extremities that may be a little worse    Physical Examination:  Blood pressure 122/80.  - General: pleasant, no distress, good eye contact   - Neck: no carotid bruits  - Cardiovascular: regular, normal rate, nl s1 and s2, no m/r/g,   - Respiratory: clear bilaterally  - Integumentary: no edema,   - Psychiatric: normal mood and affect    Diabetic foot exam:     Left Foot:   Visual Exam: callous - mild   Pulse DP: 2+ (normal)   Filament test: normal sensation    Vibratory sensation: diminished      Right Foot:   Visual Exam: callous - mild   Pulse DP: 2+ (normal)   Filament test: normal sensation    Vibratory sensation: diminished        Data Reviewed:   Component      Latest Ref Rng & Units 12/29/2020 12/29/2020 12/29/2020 12/29/2020          11:24 AM 11:24 AM 11:24 AM 11:24 AM   Glucose      65 - 99 mg/dL  122 (H)     BUN      8 - 27 mg/dL  10     Creatinine      0.76 - 1.27 mg/dL  0.97     GFR est non-AA      >59 mL/min/1.73  77     GFR est AA      >59 mL/min/1.73  89     BUN/Creatinine ratio      10 - 24  10     Sodium      134 - 144 mmol/L  144     Potassium      3.5 - 5.2 mmol/L  4.6     Chloride      96 - 106 mmol/L  107 (H)     CO2      20 - 29 mmol/L  23     Calcium      8.6 - 10.2 mg/dL  9.4     Protein, total      6.0 - 8.5 g/dL  7.7     Albumin      3.7 - 4.7 g/dL  4.7     GLOBULIN, TOTAL      1.5 - 4.5 g/dL  3.0     A-G Ratio      1.2 - 2.2  1.6     Bilirubin, total      0.0 - 1.2 mg/dL  0.6     Alk. phosphatase      39 - 117 IU/L  133 (H)     AST      0 - 40 IU/L  12     ALT      0 - 44 IU/L  10     Cholesterol, total      100 - 199 mg/dL 125      Triglyceride      0 - 149 mg/dL 78      HDL Cholesterol      >39 mg/dL 32 (L)      VLDL, calculated      5 - 40 mg/dL 16      LDL, calculated      0 - 99 mg/dL 77      Creatinine, urine      Not Estab. mg/dL    50.5   Microalbumin, urine      Not Estab. ug/mL    19.0   Microalbumin/Creat.  Ratio      0 - 29 mg/g creat    38 (H)   Hemoglobin A1c, (calculated)      4.8 - 5.6 %   7.0 (H) Estimated average glucose      mg/dL   154        Assessment/Plan:     1. DM w/ Complication: his most recent Hgb A1c was 7% in 12/20 down from 8.2% in 2/20 up from 8% in 7/19 down from 8.1% in 2/19 up from 7.8% in 9/18 down from 8.2% in 3/18 up from 7.3% in 9/17 down from 7.8% in 4/17 up from 7.7% in 11/16 up from 7.2% in 6/16 up from 6.9% in 10/15 stable from 6/15 down from 8.9% in 2/15 up from 7.9% in 10/14 down from 8% in 6/14 up from 7.8% in 2/14 up from 7.4% in 10/13 down from 8.9% in June up from 8.7% in Feb 2013 up from 7.2% in Oct stable from July down from 7.6% in March 2012. A1c best it's been in 5 years so praised him for this. - cont synjardy XR 12.5/1000 mg 1 tab bid  - cont glipizide 5 mg in am and 10 mg before dinner  - check bs 1 time per day and focus on post-meal readings  - foot exam done 1/21  - optho UTD 10/19  - microalbumin nl 7/12, up to 49 in 6/13, down to 20 in 10/13, up to 44 in 10/14 so increased diovan to 160 mg daily but still 44 in 2/15. Down to 16 in 6/15 with better A1c but up to 39 in 10/15 and 47 in 6/16. Down to 32 in 11/16. Up to 42 in 9/17 so increased diovan to 320 but up to 68 in 3/18 with higher A1c, down to 19.2 in 9/18 and 10.8 in 7/19 with lower A1c and up to 38 in 12/20  - check Hgb A1c and bmp prior to next visit      2. Unspecified essential hypertension: his BP was at goal < 140/90   - cont losartan 100 mg daily   - monitor home blood pressure readings      3. Other and unspecified hyperlipidemia: Given DM, Goal LDL < 100, non-HDL < 130, and TG < 150. LDL 68 in Feb 2013 and 67 in 10/13 and 66 in 6/14 and 65 in 2/15 and 67 in 10/15 and 71 in 6/16 and 61 in 4/17 on 20 mg of lipitor. I decreased to 10 mg and LDL still 69 in 9/17 and 72 in 9/18 and 78 in 7/19 and 77 in 12/20 so will stay on this dose.   - cont lipitor 10 mg daily  - check lipids in 1/21            Patient Instructions   1) Your Hemoglobin A1c (3 month test of blood sugar) was 7% down from 8.2% in 2/20 and is the best it's been in 5 years. Keep up the good work. 2) Your liver and kidney and cholesterol are normal.    3) Your urine protein was just slightly high but this has been intermittently elevated over the past 7 years and tends to go up and down but has not consistently been high so we'll focus on keeping your blood pressure and diabetes controlled to keep your kidney function normal.    4) Your blood pressure is well controlled.            Follow-up and Dispositions    · Return 6/30/21 at 11:50am.               Copy sent to:  Eliu Jones MD

## 2021-01-06 NOTE — PATIENT INSTRUCTIONS
1) Your Hemoglobin A1c (3 month test of blood sugar) was 7% down from 8.2% in 2/20 and is the best it's been in 5 years. Keep up the good work. 2) Your liver and kidney and cholesterol are normal. 
 
3) Your urine protein was just slightly high but this has been intermittently elevated over the past 7 years and tends to go up and down but has not consistently been high so we'll focus on keeping your blood pressure and diabetes controlled to keep your kidney function normal. 
 
4) Your blood pressure is well controlled.

## 2021-01-25 RX ORDER — LOSARTAN POTASSIUM 100 MG/1
100 TABLET ORAL DAILY
Qty: 90 TAB | Refills: 3 | Status: SHIPPED | OUTPATIENT
Start: 2021-01-25 | End: 2022-01-31

## 2021-02-07 RX ORDER — GLIPIZIDE 5 MG/1
TABLET ORAL
Qty: 270 TAB | Refills: 3 | Status: SHIPPED | OUTPATIENT
Start: 2021-02-07 | End: 2022-01-31

## 2021-03-02 RX ORDER — EMPAGLIFLOZIN, METFORMIN HYDROCHLORIDE 12.5; 1 MG/1; MG/1
TABLET, EXTENDED RELEASE ORAL
Qty: 180 EACH | Refills: 3 | Status: SHIPPED | OUTPATIENT
Start: 2021-03-02 | End: 2021-06-23

## 2021-04-29 RX ORDER — ATORVASTATIN CALCIUM 10 MG/1
10 TABLET, FILM COATED ORAL DAILY
Qty: 90 TAB | Refills: 3 | Status: SHIPPED | OUTPATIENT
Start: 2021-04-29 | End: 2022-04-22

## 2021-06-16 DIAGNOSIS — I10 ESSENTIAL HYPERTENSION, BENIGN: ICD-10-CM

## 2021-06-16 DIAGNOSIS — E11.21 TYPE 2 DIABETES WITH NEPHROPATHY (HCC): ICD-10-CM

## 2021-06-16 DIAGNOSIS — E78.5 HYPERLIPIDEMIA LDL GOAL <100: ICD-10-CM

## 2021-06-19 LAB
BUN SERPL-MCNC: 12 MG/DL (ref 8–27)
BUN/CREAT SERPL: 14 (ref 10–24)
CALCIUM SERPL-MCNC: 9.2 MG/DL (ref 8.6–10.2)
CHLORIDE SERPL-SCNC: 110 MMOL/L (ref 96–106)
CO2 SERPL-SCNC: 21 MMOL/L (ref 20–29)
CREAT SERPL-MCNC: 0.83 MG/DL (ref 0.76–1.27)
EST. AVERAGE GLUCOSE BLD GHB EST-MCNC: 137 MG/DL
GLUCOSE SERPL-MCNC: 191 MG/DL (ref 65–99)
HBA1C MFR BLD: 6.4 % (ref 4.8–5.6)
POTASSIUM SERPL-SCNC: 4.4 MMOL/L (ref 3.5–5.2)
SODIUM SERPL-SCNC: 145 MMOL/L (ref 134–144)

## 2021-06-23 ENCOUNTER — OFFICE VISIT (OUTPATIENT)
Dept: ENDOCRINOLOGY | Age: 76
End: 2021-06-23
Payer: COMMERCIAL

## 2021-06-23 VITALS
SYSTOLIC BLOOD PRESSURE: 131 MMHG | DIASTOLIC BLOOD PRESSURE: 70 MMHG | WEIGHT: 187 LBS | BODY MASS INDEX: 28.34 KG/M2 | HEIGHT: 68 IN | HEART RATE: 69 BPM

## 2021-06-23 DIAGNOSIS — E78.5 HYPERLIPIDEMIA LDL GOAL <100: ICD-10-CM

## 2021-06-23 DIAGNOSIS — I10 ESSENTIAL HYPERTENSION, BENIGN: ICD-10-CM

## 2021-06-23 DIAGNOSIS — E11.21 TYPE 2 DIABETES WITH NEPHROPATHY (HCC): Primary | ICD-10-CM

## 2021-06-23 PROCEDURE — G8536 NO DOC ELDER MAL SCRN: HCPCS | Performed by: INTERNAL MEDICINE

## 2021-06-23 PROCEDURE — 3044F HG A1C LEVEL LT 7.0%: CPT | Performed by: INTERNAL MEDICINE

## 2021-06-23 PROCEDURE — 3017F COLORECTAL CA SCREEN DOC REV: CPT | Performed by: INTERNAL MEDICINE

## 2021-06-23 PROCEDURE — G8754 DIAS BP LESS 90: HCPCS | Performed by: INTERNAL MEDICINE

## 2021-06-23 PROCEDURE — G8427 DOCREV CUR MEDS BY ELIG CLIN: HCPCS | Performed by: INTERNAL MEDICINE

## 2021-06-23 PROCEDURE — G8419 CALC BMI OUT NRM PARAM NOF/U: HCPCS | Performed by: INTERNAL MEDICINE

## 2021-06-23 PROCEDURE — 2022F DILAT RTA XM EVC RTNOPTHY: CPT | Performed by: INTERNAL MEDICINE

## 2021-06-23 PROCEDURE — 99214 OFFICE O/P EST MOD 30 MIN: CPT | Performed by: INTERNAL MEDICINE

## 2021-06-23 PROCEDURE — G8432 DEP SCR NOT DOC, RNG: HCPCS | Performed by: INTERNAL MEDICINE

## 2021-06-23 PROCEDURE — 1101F PT FALLS ASSESS-DOCD LE1/YR: CPT | Performed by: INTERNAL MEDICINE

## 2021-06-23 PROCEDURE — G8752 SYS BP LESS 140: HCPCS | Performed by: INTERNAL MEDICINE

## 2021-06-23 RX ORDER — EMPAGLIFLOZIN, METFORMIN HYDROCHLORIDE 12.5; 1 MG/1; MG/1
TABLET, EXTENDED RELEASE ORAL
Qty: 180 EACH | Refills: 3
Start: 2021-06-23 | End: 2022-01-31

## 2021-06-23 NOTE — PATIENT INSTRUCTIONS
1) You can try taking plavinol which has 500 mg of berberine 1 cap daily in the morning. If you can't get this supplement then find just plain berberine 500 mg caps or tabs at a local vitamin store or online to take 1 in the morning. 2) Decrease the synjardy to just 1 tab at dinner and stop the morning dose to see if this helps with diarrhea. 3) Your blood pressure is controlled and your creatinine (kidney test) is normal.      4) Our office is located at:  [address] [place] [address] ([facility]), 3rd floor, [address] [place] [address].  [address] [place] [address]  [phone] (phone)

## 2021-06-23 NOTE — PROGRESS NOTES
Chief Complaint   Patient presents with    Diabetes     History of Present Illness: Sb Garcia is a 77 y.o. male here for follow up of diabetes. His sister recommending trying plavinol supplement which contains 500 mg of berberine and was taking 1 tab daily in the morning starting in April and continued his synjardy 1 tab bid and glipizide 1 tab with breakfast and 2 tabs with dinner. The longer he took this started having some weight loss from decreased appetite and more low blood sugars especially when working outside and cut back to 1 tab every other day of the plavinol at the end of April and stayed on this until the end of May when he decided to stop this. Has had less low sugars with stopping this supplement. Did not notice any increase in diarrhea on the supplement but does tend to deal with this on a daily basis and has been dealing with this over a year and just never mentioned this. Fasting sugars are in the 130-140s at the highest with one down to the 120s. Hasn't checked after meals. Still taking losartan 100 mg daily and lipitor 10 mg daily. Current Outpatient Medications   Medication Sig    Lactobacillus acidophilus (PROBIOTIC PO) Take  by mouth.  atorvastatin (LIPITOR) 10 mg tablet Take 1 Tab by mouth daily.  empagliflozin-metformin (Synjardy XR) 12.5-1,000 mg TBph TAKE 2 TABLETS ONCE DAILY    glipiZIDE (GLUCOTROL) 5 mg tablet TAKE 1 TAB BEFORE BREAKFAST AND 2 TABS BEFORE DINNER    losartan (COZAAR) 100 mg tablet Take 1 Tab by mouth daily.  b complex vitamins tablet Take 1 Tab by mouth daily.  ONETOUCH ULTRA BLUE TEST STRIP strip TEST ONCE DAILY    Lancets (ONE TOUCH ULTRASOFT LANCETS) Misc by Does Not Apply route. Check blood sugar as needed. No current facility-administered medications for this visit.      Allergies   Allergen Reactions    Ace Inhibitors Cough    Cialis [Tadalafil] Other (comments)     Back pain    Onglyza [Saxagliptin] Other (comments) headache     Review of Systems: PER HPI    Physical Examination:  Blood pressure 131/70, pulse 69, height 5' 8\" (1.727 m), weight 187 lb (84.8 kg). - General: pleasant, no distress, good eye contact   - Neck: no carotid bruits  - Cardiovascular: regular, normal rate, nl s1 and s2, no m/r/g,   - Respiratory: clear bilaterally  - Integumentary: no edema,   - Psychiatric: normal mood and affect    Diabetic foot exam:     Left Foot:   Visual Exam: callous - mild   Pulse DP: 2+ (normal)   Filament test: reduced sensation    Vibratory sensation: diminished      Right Foot:   Visual Exam: callous - mild   Pulse DP: 2+ (normal)   Filament test: reduced sensation    Vibratory sensation: diminished        Data Reviewed:   Component      Latest Ref Rng & Units 6/18/2021 6/18/2021           1:13 PM  1:13 PM   Glucose      65 - 99 mg/dL 191 (H)    BUN      8 - 27 mg/dL 12    Creatinine      0.76 - 1.27 mg/dL 0.83    GFR est non-AA      >59 mL/min/1.73 87    GFR est AA      >59 mL/min/1.73 100    BUN/Creatinine ratio      10 - 24 14    Sodium      134 - 144 mmol/L 145 (H)    Potassium      3.5 - 5.2 mmol/L 4.4    Chloride      96 - 106 mmol/L 110 (H)    CO2      20 - 29 mmol/L 21    Calcium      8.6 - 10.2 mg/dL 9.2    Hemoglobin A1c, (calculated)      4.8 - 5.6 %  6.4 (H)   Estimated average glucose      mg/dL  137       Assessment/Plan:     1. DM w/ Complication: his most recent Hgb A1c was 6.4% in 6/21 down from 7% in 12/20 down from 8.2% in 2/20 up from 8% in 7/19 down from 8.1% in 2/19 up from 7.8% in 9/18 down from 8.2% in 3/18 up from 7.3% in 9/17 down from 7.8% in 4/17 up from 7.7% in 11/16 up from 7.2% in 6/16 up from 6.9% in 10/15 stable from 6/15 down from 8.9% in 2/15 up from 7.9% in 10/14 down from 8% in 6/14 up from 7.8% in 2/14 up from 7.4% in 10/13 down from 8.9% in June up from 8.7% in Feb 2013 up from 7.2% in Oct stable from July down from 7.6% in March 2012.    A1c is the best it's ever been and had been taking berberine in addition to the synjardy starting in 4/21 and had decreased appetite and weight loss and low sugars likely from too much total metformin as berberine has metformin like properties. Given he has had diarrhea for over a year on the synjardy XR, will try decreasing this and adding back berberine in the morning to lower his total dose of metformin from 2000 mg to 1500 mg daily  - begin berberine 500 mg daily  - decrease synjardy XR 12.5/1000 mg to 1 tab at dinner only  - cont glipizide 5 mg in am and 10 mg before dinner  - check bs 1 time per day and focus on post-meal readings  - foot exam done 6/21  - optho UTD 10/19  - microalbumin nl 7/12, up to 49 in 6/13, down to 20 in 10/13, up to 44 in 10/14 so increased diovan to 160 mg daily but still 44 in 2/15. Down to 16 in 6/15 with better A1c but up to 39 in 10/15 and 47 in 6/16. Down to 32 in 11/16. Up to 42 in 9/17 so increased diovan to 320 but up to 68 in 3/18 with higher A1c, down to 19.2 in 9/18 and 10.8 in 7/19 with lower A1c and up to 38 in 12/20  - check Hgb A1c and cmp and microalbumin prior to next visit      2. Unspecified essential hypertension: his BP was at goal < 140/90   - cont losartan 100 mg daily   - monitor home blood pressure readings      3. Other and unspecified hyperlipidemia: Given DM, Goal LDL < 100, non-HDL < 130, and TG < 150. LDL 68 in Feb 2013 and 67 in 10/13 and 66 in 6/14 and 65 in 2/15 and 67 in 10/15 and 71 in 6/16 and 61 in 4/17 on 20 mg of lipitor. I decreased to 10 mg and LDL still 69 in 9/17 and 72 in 9/18 and 78 in 7/19 and 77 in 12/20 so will stay on this dose. - cont lipitor 10 mg daily  - check lipids prior to next visit              Patient Instructions   1) You can try taking plavinol which has 500 mg of berberine 1 cap daily in the morning. If you can't get this supplement then find just plain berberine 500 mg caps or tabs at a local vitamin store or online to take 1 in the morning.       2) Decrease the synjardy to just 1 tab at dinner and stop the morning dose to see if this helps with diarrhea. 3) Your blood pressure is controlled and your creatinine (kidney test) is normal.      4) Our office is located at:  [address] [place] [address] ([facility]), 3rd floor, [address] [facility].  Janey Salazar [address] [facility], [address] [facility]  [phone] (phone)        Follow-up and Dispositions    · Return 1/11/22 at 11:50am.               Copy sent to:  Patrick Javier MD

## 2021-12-28 DIAGNOSIS — E78.5 HYPERLIPIDEMIA LDL GOAL <100: ICD-10-CM

## 2021-12-28 DIAGNOSIS — E11.21 TYPE 2 DIABETES WITH NEPHROPATHY (HCC): ICD-10-CM

## 2021-12-28 DIAGNOSIS — I10 ESSENTIAL HYPERTENSION, BENIGN: ICD-10-CM

## 2022-01-07 LAB
ALBUMIN SERPL-MCNC: 4.6 G/DL (ref 3.7–4.7)
ALBUMIN/CREAT UR: 41 MG/G CREAT (ref 0–29)
ALBUMIN/GLOB SERPL: 1.8 {RATIO} (ref 1.2–2.2)
ALP SERPL-CCNC: 155 IU/L (ref 44–121)
ALT SERPL-CCNC: 13 IU/L (ref 0–44)
AST SERPL-CCNC: 15 IU/L (ref 0–40)
BILIRUB SERPL-MCNC: 1.1 MG/DL (ref 0–1.2)
BUN SERPL-MCNC: 8 MG/DL (ref 8–27)
BUN/CREAT SERPL: 9 (ref 10–24)
CALCIUM SERPL-MCNC: 9.3 MG/DL (ref 8.6–10.2)
CHLORIDE SERPL-SCNC: 108 MMOL/L (ref 96–106)
CHOLEST SERPL-MCNC: 121 MG/DL (ref 100–199)
CO2 SERPL-SCNC: 22 MMOL/L (ref 20–29)
CREAT SERPL-MCNC: 0.91 MG/DL (ref 0.76–1.27)
CREAT UR-MCNC: 62.2 MG/DL
EST. AVERAGE GLUCOSE BLD GHB EST-MCNC: 160 MG/DL
GLOBULIN SER CALC-MCNC: 2.5 G/DL (ref 1.5–4.5)
GLUCOSE SERPL-MCNC: 135 MG/DL (ref 65–99)
HBA1C MFR BLD: 7.2 % (ref 4.8–5.6)
HDLC SERPL-MCNC: 35 MG/DL
LDLC SERPL CALC-MCNC: 71 MG/DL (ref 0–99)
MICROALBUMIN UR-MCNC: 25.7 UG/ML
POTASSIUM SERPL-SCNC: 4.2 MMOL/L (ref 3.5–5.2)
PROT SERPL-MCNC: 7.1 G/DL (ref 6–8.5)
SODIUM SERPL-SCNC: 145 MMOL/L (ref 134–144)
TRIGL SERPL-MCNC: 76 MG/DL (ref 0–149)
VLDLC SERPL CALC-MCNC: 15 MG/DL (ref 5–40)

## 2022-01-11 ENCOUNTER — OFFICE VISIT (OUTPATIENT)
Dept: ENDOCRINOLOGY | Age: 77
End: 2022-01-11
Payer: COMMERCIAL

## 2022-01-11 VITALS
WEIGHT: 187.2 LBS | SYSTOLIC BLOOD PRESSURE: 124 MMHG | HEIGHT: 68 IN | HEART RATE: 72 BPM | DIASTOLIC BLOOD PRESSURE: 64 MMHG | BODY MASS INDEX: 28.37 KG/M2

## 2022-01-11 DIAGNOSIS — E78.5 HYPERLIPIDEMIA LDL GOAL <100: ICD-10-CM

## 2022-01-11 DIAGNOSIS — E11.21 TYPE 2 DIABETES WITH NEPHROPATHY (HCC): Primary | ICD-10-CM

## 2022-01-11 DIAGNOSIS — I10 ESSENTIAL HYPERTENSION, BENIGN: ICD-10-CM

## 2022-01-11 PROCEDURE — G8432 DEP SCR NOT DOC, RNG: HCPCS | Performed by: INTERNAL MEDICINE

## 2022-01-11 PROCEDURE — G8536 NO DOC ELDER MAL SCRN: HCPCS | Performed by: INTERNAL MEDICINE

## 2022-01-11 PROCEDURE — G8427 DOCREV CUR MEDS BY ELIG CLIN: HCPCS | Performed by: INTERNAL MEDICINE

## 2022-01-11 PROCEDURE — G8754 DIAS BP LESS 90: HCPCS | Performed by: INTERNAL MEDICINE

## 2022-01-11 PROCEDURE — 2022F DILAT RTA XM EVC RTNOPTHY: CPT | Performed by: INTERNAL MEDICINE

## 2022-01-11 PROCEDURE — 3017F COLORECTAL CA SCREEN DOC REV: CPT | Performed by: INTERNAL MEDICINE

## 2022-01-11 PROCEDURE — 3051F HG A1C>EQUAL 7.0%<8.0%: CPT | Performed by: INTERNAL MEDICINE

## 2022-01-11 PROCEDURE — G8419 CALC BMI OUT NRM PARAM NOF/U: HCPCS | Performed by: INTERNAL MEDICINE

## 2022-01-11 PROCEDURE — 99214 OFFICE O/P EST MOD 30 MIN: CPT | Performed by: INTERNAL MEDICINE

## 2022-01-11 PROCEDURE — 1101F PT FALLS ASSESS-DOCD LE1/YR: CPT | Performed by: INTERNAL MEDICINE

## 2022-01-11 PROCEDURE — G8752 SYS BP LESS 140: HCPCS | Performed by: INTERNAL MEDICINE

## 2022-01-11 RX ORDER — LATANOPROST 50 UG/ML
1 SOLUTION/ DROPS OPHTHALMIC
COMMUNITY
Start: 2022-01-07

## 2022-01-11 NOTE — PATIENT INSTRUCTIONS
1) Your Hemoglobin A1c (3 month test of blood sugar) is 7.2% which is at goal of 7-7.5%. Stay on your current regimen of berberine and synjardy and glipizide. 2) Your total and LDL (bad cholesterol) are lower and your HDL (good cholesterol) is higher than last year. 3) Your BUN and creatinine are markers of kidney function. Your values are normal.    4) Your urine protein is stable over the past year. 5) Your blood pressure is controlled. 6) Plan on seeing your PCP in 6 months and I'll see you back in one year. Feel free to send me a message through [email or web address] if you have any questions or concerns prior to your next visit.

## 2022-01-11 NOTE — PROGRESS NOTES
Chief Complaint   Patient presents with    Diabetes     pcp and pharmacy confirmed    Other     consent obtain for eye report     History of Present Illness: Taylor Xiao is a 77 y.o. male here for follow up of diabetes. Weight stable since last visit in 6/21. Has been taking one 500 mg berberine in the morning and decreased the synjardy to 1 tab at dinner and this has helped with the diarrhea. Still taking same dose of glipizide. Fasting sugar was 155 this morning and most are in the 130-140s. Doesn't tend to check at other times of day. Still has tried to stay active during the day. Still gets the BP and lipid regimen. Willing to be seen annually at this point. Current Outpatient Medications   Medication Sig    Berb Johns/herbal complex no.18 (BERBERINE-HERBAL COMB NO.18 PO) Take 500 mg by mouth daily.  empagliflozin-metformin (Synjardy XR) 12.5-1,000 mg TBph TAKE 1 TABLET ONCE DAILY--Dose change 6/23/21--updated med list--did not send prescription to the pharmacy    atorvastatin (LIPITOR) 10 mg tablet Take 1 Tab by mouth daily.  glipiZIDE (GLUCOTROL) 5 mg tablet TAKE 1 TAB BEFORE BREAKFAST AND 2 TABS BEFORE DINNER    losartan (COZAAR) 100 mg tablet Take 1 Tab by mouth daily.  b complex vitamins tablet Take 1 Tab by mouth daily.  Lancets (ONE TOUCH ULTRASOFT LANCETS) Misc by Does Not Apply route. Check blood sugar as needed.  latanoprost (XALATAN) 0.005 % ophthalmic solution     ONETOUCH ULTRA BLUE TEST STRIP strip TEST ONCE DAILY     No current facility-administered medications for this visit. Allergies   Allergen Reactions    Ace Inhibitors Cough    Cialis [Tadalafil] Other (comments)     Back pain    Onglyza [Saxagliptin] Other (comments)     headache     Review of Systems: PER HPI    Physical Examination:  Blood pressure 124/64, pulse 72, height 5' 8\" (1.727 m), weight 187 lb 3.2 oz (84.9 kg).   - General: pleasant, no distress, good eye contact   - Neck: no carotid bruits  - Cardiovascular: regular, normal rate, nl s1 and s2, no m/r/g,   - Respiratory: clear bilaterally  - Integumentary: no edema,   - Psychiatric: normal mood and affect    Data Reviewed:   Component      Latest Ref Rng & Units 1/6/2022 1/6/2022 1/6/2022 1/6/2022          11:11 AM 11:11 AM 11:11 AM 11:11 AM   Glucose      65 - 99 mg/dL  135 (H)     BUN      8 - 27 mg/dL  8     Creatinine      0.76 - 1.27 mg/dL  0.91     GFR est non-AA      >59 mL/min/1.73  82     GFR est AA      >59 mL/min/1.73  95     BUN/Creatinine ratio      10 - 24  9 (L)     Sodium      134 - 144 mmol/L  145 (H)     Potassium      3.5 - 5.2 mmol/L  4.2     Chloride      96 - 106 mmol/L  108 (H)     CO2      20 - 29 mmol/L  22     Calcium      8.6 - 10.2 mg/dL  9.3     Protein, total      6.0 - 8.5 g/dL  7.1     Albumin      3.7 - 4.7 g/dL  4.6     GLOBULIN, TOTAL      1.5 - 4.5 g/dL  2.5     A-G Ratio      1.2 - 2.2  1.8     Bilirubin, total      0.0 - 1.2 mg/dL  1.1     Alk. phosphatase      44 - 121 IU/L  155 (H)     AST      0 - 40 IU/L  15     ALT      0 - 44 IU/L  13     Cholesterol, total      100 - 199 mg/dL 121      Triglyceride      0 - 149 mg/dL 76      HDL Cholesterol      >39 mg/dL 35 (L)      VLDL, calculated      5 - 40 mg/dL 15      LDL, calculated      0 - 99 mg/dL 71      Creatinine, urine      Not Estab. mg/dL   62.2    Microalbumin, urine      Not Estab. ug/mL   25.7    Microalbumin/Creat. Ratio      0 - 29 mg/g creat   41 (H)    Hemoglobin A1c, (calculated)      4.8 - 5.6 %    7.2 (H)   Estimated average glucose      mg/dL    160       Assessment/Plan:     1.  DM w/ Complication: his most recent Hgb A1c was 7.2% in 1/22 up from 6.4% in 6/21 down from 7% in 12/20 down from 8.2% in 2/20 up from 8% in 7/19 down from 8.1% in 2/19 up from 7.8% in 9/18 down from 8.2% in 3/18 up from 7.3% in 9/17 down from 7.8% in 4/17 up from 7.7% in 11/16 up from 7.2% in 6/16 up from 6.9% in 10/15 stable from 6/15 down from 8.9% in 2/15 up from 7.9% in 10/14 down from 8% in 6/14 up from 7.8% in 2/14 up from 7.4% in 10/13 down from 8.9% in June up from 8.7% in Feb 2013 up from 7.2% in Oct stable from July down from 7.6% in March 2012. A1c is at goal of 7-7.5% so no changes needed. - cont berberine 500 mg daily  - cont synjardy XR 12.5/1000 mg 1 tab at dinner only  - cont glipizide 5 mg in am and 10 mg before dinner  - check bs 1 time per day and focus on post-meal readings  - foot exam done 6/21  - optho UTD 10/19  - microalbumin nl 7/12, up to 49 in 6/13, down to 20 in 10/13, up to 44 in 10/14 so increased diovan to 160 mg daily but still 44 in 2/15. Down to 16 in 6/15 with better A1c but up to 39 in 10/15 and 47 in 6/16. Down to 32 in 11/16. Up to 42 in 9/17 so increased diovan to 320 but up to 68 in 3/18 with higher A1c, down to 19.2 in 9/18 and 10.8 in 7/19 with lower A1c and up to 38 in 12/20 and 41 in 1/22  - check Hgb A1c and cmp and microalbumin prior to next visit        2. Unspecified essential hypertension: his BP was at goal < 140/90   - cont losartan 100 mg daily   - monitor home blood pressure readings        3. Other and unspecified hyperlipidemia: Given DM, Goal LDL < 100, non-HDL < 130, and TG < 150. LDL 68 in Feb 2013 and 67 in 10/13 and 66 in 6/14 and 65 in 2/15 and 67 in 10/15 and 71 in 6/16 and 61 in 4/17 on 20 mg of lipitor. I decreased to 10 mg and LDL still 69 in 9/17 and 72 in 9/18 and 78 in 7/19 and 77 in 12/20 and 71 in 1/22 so will stay on this dose. - cont lipitor 10 mg daily  - check lipids prior to next visit              Patient Instructions   1) Your Hemoglobin A1c (3 month test of blood sugar) is 7.2% which is at goal of 7-7.5%. Stay on your current regimen of berberine and synjardy and glipizide. 2) Your total and LDL (bad cholesterol) are lower and your HDL (good cholesterol) is higher than last year. 3) Your BUN and creatinine are markers of kidney function.   Your values are normal.    4) Your urine protein is stable over the past year. 5) Your blood pressure is controlled. 6) Plan on seeing your PCP in 6 months and I'll see you back in one year. Feel free to send me a message through [employer] if you have any questions or concerns prior to your next visit. Follow-up and Dispositions    · Return in about 1 year (around 1/11/2023).                Copy sent to:  Sami Pettit MD

## 2022-01-31 RX ORDER — GLIPIZIDE 5 MG/1
TABLET ORAL
Qty: 270 TABLET | Refills: 3 | Status: SHIPPED | OUTPATIENT
Start: 2022-01-31

## 2022-01-31 RX ORDER — LOSARTAN POTASSIUM 100 MG/1
TABLET ORAL
Qty: 90 TABLET | Refills: 3 | Status: SHIPPED | OUTPATIENT
Start: 2022-01-31

## 2022-01-31 RX ORDER — EMPAGLIFLOZIN, METFORMIN HYDROCHLORIDE 12.5; 1 MG/1; MG/1
TABLET, EXTENDED RELEASE ORAL
Qty: 180 EACH | Refills: 3 | Status: SHIPPED | OUTPATIENT
Start: 2022-01-31

## 2022-02-23 ENCOUNTER — OFFICE VISIT (OUTPATIENT)
Dept: INTERNAL MEDICINE CLINIC | Age: 77
End: 2022-02-23
Payer: COMMERCIAL

## 2022-02-23 VITALS
DIASTOLIC BLOOD PRESSURE: 80 MMHG | HEART RATE: 79 BPM | SYSTOLIC BLOOD PRESSURE: 137 MMHG | WEIGHT: 189 LBS | BODY MASS INDEX: 28.64 KG/M2 | TEMPERATURE: 98.6 F | HEIGHT: 68 IN | RESPIRATION RATE: 20 BRPM | OXYGEN SATURATION: 98 %

## 2022-02-23 DIAGNOSIS — I10 ESSENTIAL HYPERTENSION, BENIGN: ICD-10-CM

## 2022-02-23 DIAGNOSIS — E53.8 VITAMIN B 12 DEFICIENCY: ICD-10-CM

## 2022-02-23 DIAGNOSIS — G62.9 NEUROPATHY: Primary | ICD-10-CM

## 2022-02-23 DIAGNOSIS — E11.21 TYPE 2 DIABETES WITH NEPHROPATHY (HCC): ICD-10-CM

## 2022-02-23 DIAGNOSIS — E78.5 HYPERLIPIDEMIA LDL GOAL <100: ICD-10-CM

## 2022-02-23 PROCEDURE — G8427 DOCREV CUR MEDS BY ELIG CLIN: HCPCS | Performed by: INTERNAL MEDICINE

## 2022-02-23 PROCEDURE — 2022F DILAT RTA XM EVC RTNOPTHY: CPT | Performed by: INTERNAL MEDICINE

## 2022-02-23 PROCEDURE — G8754 DIAS BP LESS 90: HCPCS | Performed by: INTERNAL MEDICINE

## 2022-02-23 PROCEDURE — G8419 CALC BMI OUT NRM PARAM NOF/U: HCPCS | Performed by: INTERNAL MEDICINE

## 2022-02-23 PROCEDURE — G8752 SYS BP LESS 140: HCPCS | Performed by: INTERNAL MEDICINE

## 2022-02-23 PROCEDURE — 99214 OFFICE O/P EST MOD 30 MIN: CPT | Performed by: INTERNAL MEDICINE

## 2022-02-23 PROCEDURE — G8536 NO DOC ELDER MAL SCRN: HCPCS | Performed by: INTERNAL MEDICINE

## 2022-02-23 PROCEDURE — 3017F COLORECTAL CA SCREEN DOC REV: CPT | Performed by: INTERNAL MEDICINE

## 2022-02-23 PROCEDURE — 1101F PT FALLS ASSESS-DOCD LE1/YR: CPT | Performed by: INTERNAL MEDICINE

## 2022-02-23 PROCEDURE — G8510 SCR DEP NEG, NO PLAN REQD: HCPCS | Performed by: INTERNAL MEDICINE

## 2022-02-23 NOTE — PROGRESS NOTES
HISTORY OF PRESENT ILLNESS  Emily Tello is a 77 y.o. male. ASSESSMENT   Numbness  The history is provided by the patient (notes 2 great toes cold at HS, now with numbness). This is a chronic problem. Episode onset: 2- 3 yrs. The problem has been gradually worsening. Primary symptoms include loss of sensation. There has been no fever. Associated medical issues comments: diabetes. Review of Systems   Cardiovascular: Negative for claudication. Musculoskeletal: Negative for back pain. Neurological: Positive for tingling, sensory change and numbness. Physical Exam  Cardiovascular:      Pulses:           Dorsalis pedis pulses are 2+ on the right side and 2+ on the left side. Posterior tibial pulses are 2+ on the right side and 2+ on the left side. Musculoskeletal:      Right foot: Normal range of motion. Left foot: Normal range of motion. Feet:      Right foot:      Protective Sensation: 4 sites tested. 4 sites sensed. Skin integrity: Dry skin present. No ulcer. Toenail Condition: Right toenails are abnormally thick. Left foot:      Protective Sensation: 4 sites tested. 4 sites sensed. Skin integrity: Dry skin present. No ulcer. Toenail Condition: Left toenails are abnormally thick. ASSESSMENT and PLAN  Diagnoses and all orders for this visit:    1. Neuropathy- likely diabetic. He declines treatment so I feel it's fine to defer EMG    2. Essential hypertension, benign- Continue current regimen of prescription and / or OTC medications     3. Hyperlipidemia LDL goal <100- See endocrinologist as directed. 4. Type 2 diabetes with nephropathy [facility])- See endocrinologist as directed. 5. Vitamin B 12 deficiency- rule out   -     VITAMIN B12; Future    6.  Preventive - MWV in one month

## 2022-02-23 NOTE — PATIENT INSTRUCTIONS
Diabetic Neuropathy: Care Instructions  Overview     When you have diabetes, your blood sugar level may get too high. Over time, high blood sugar levels can damage nerves. This is called diabetic neuropathy. Nerve damage can cause pain, burning, tingling, and numbness and may leave you feeling weak. The feet are often affected. When you have nerve damage in your feet, you cannot feel your feet and toes as well as normal and may not notice cuts or sores. Even a small injury can lead to a serious infection. It is very important that you follow your doctor's advice on foot care. Sometimes diabetes damages nerves that help the body function. If this happens, your blood pressure, sweating, digestion, and urination might be affected. Your doctor may give you a target range for your blood sugar that is higher or lower than you are used to. Try to keep your blood sugar very close to this target range to prevent more damage. Follow-up care is a key part of your treatment and safety. Be sure to make and go to all appointments, and call your doctor if you are having problems. It's also a good idea to know your test results and keep a list of the medicines you take. How can you care for yourself at home? · Take your medicines exactly as prescribed. Call your doctor if you think you are having a problem with your medicine. · Try to keep blood sugar in your target range. ? Follow your meal plan to know how much carbohydrate you need for meals and snacks. A registered dietitian or diabetes educator can help you plan meals. ? Try to get at least 30 minutes of exercise on most days. ? Check your blood sugar as many times each day as your doctor recommends. · Take and record your blood pressure at home if your doctor tells you to. To take your blood pressure at home:  ? Ask your doctor to check your blood pressure monitor to be sure it is accurate and the cuff fits you.  Also ask your doctor to watch you to make sure that you are using it right. ? Do not use medicine known to raise blood pressure (such as some nasal decongestant sprays) before taking your blood pressure. ? Avoid taking your blood pressure if you have just exercised or are nervous or upset. Rest at least 15 minutes before you take a reading. · Do not smoke. Smoking can increase your chance for a heart attack or stroke. If you need help quitting, talk to your doctor about stop-smoking programs and medicines. These can increase your chances of quitting for good. · Limit alcohol to 2 drinks a day for men and 1 drink a day for women. Too much alcohol can cause health problems. · Eat small meals often, rather than 2 or 3 large meals a day. To care for your feet  · Prevent injury by wearing shoes at all times, even when you are indoors. · Do foot care as part of your daily routine. Wash your feet and then rub lotion on your feet, but not between your toes. Use a handheld mirror or magnifying mirror to inspect your feet for blisters, cuts, cracks, or sores. · Have your toenails trimmed and filed straight across. · Wear shoes and socks that fit well. Soft shoes that have good support and that fit well (such as tennis shoes) are best for your feet. · Check your shoes for any loose objects or rough edges before you put them on. · Ask your doctor to check your feet during each visit. Your doctor may notice a foot problem you have missed. · Get early treatment for any foot problem, even a minor one. When should you call for help? Call your doctor now or seek immediate medical care if:    · You have symptoms of infection, such as:  ? Increased pain, swelling, warmth, or redness. ? Red streaks leading from the area. ? Pus draining from the area. ? A fever.     · You have new or worse numbness, pain, or tingling in any part of your body.    Watch closely for changes in your health, and be sure to contact your doctor if:    · You have a new problem with your feet, such as:  ? A new sore or ulcer. ? A break in the skin that is not healing after several days. ? Bleeding corns or calluses. ? An ingrown toenail.     · You do not get better as expected. Where can you learn more? Go to http://www.gray.com/  Enter V828 in the search box to learn more about \"Diabetic Neuropathy: Care Instructions. \"  Current as of: August 31, 2020               Content Version: 13.0  © 2006-2021 [employer]. Care instructions adapted under license by [employer] (which disclaims liability or warranty for this information). If you have questions about a medical condition or this instruction, always ask your healthcare professional. [address] any warranty or liability for your use of this information.

## 2022-02-24 LAB — VIT B12 SERPL-MCNC: 871 PG/ML (ref 193–986)

## 2022-03-19 PROBLEM — Z71.89 ADVANCE DIRECTIVE DISCUSSED WITH PATIENT: Status: ACTIVE | Noted: 2017-09-27

## 2022-03-20 PROBLEM — E11.21 TYPE 2 DIABETES WITH NEPHROPATHY (HCC): Status: ACTIVE | Noted: 2018-03-14

## 2022-04-18 ENCOUNTER — OFFICE VISIT (OUTPATIENT)
Dept: INTERNAL MEDICINE CLINIC | Age: 77
End: 2022-04-18
Payer: COMMERCIAL

## 2022-04-18 VITALS
TEMPERATURE: 97.5 F | HEIGHT: 68 IN | OXYGEN SATURATION: 96 % | DIASTOLIC BLOOD PRESSURE: 64 MMHG | SYSTOLIC BLOOD PRESSURE: 129 MMHG | BODY MASS INDEX: 28.37 KG/M2 | HEART RATE: 58 BPM | RESPIRATION RATE: 16 BRPM | WEIGHT: 187.2 LBS

## 2022-04-18 DIAGNOSIS — E11.21 TYPE 2 DIABETES WITH NEPHROPATHY (HCC): ICD-10-CM

## 2022-04-18 DIAGNOSIS — Z12.5 SCREENING FOR PROSTATE CANCER: ICD-10-CM

## 2022-04-18 DIAGNOSIS — Z00.00 MEDICARE ANNUAL WELLNESS VISIT, SUBSEQUENT: Primary | ICD-10-CM

## 2022-04-18 DIAGNOSIS — E78.5 HYPERLIPIDEMIA LDL GOAL <100: ICD-10-CM

## 2022-04-18 DIAGNOSIS — D12.6 ADENOMATOUS POLYP OF COLON, UNSPECIFIED PART OF COLON: ICD-10-CM

## 2022-04-18 DIAGNOSIS — G62.9 NEUROPATHY: ICD-10-CM

## 2022-04-18 DIAGNOSIS — I10 ESSENTIAL HYPERTENSION, BENIGN: ICD-10-CM

## 2022-04-18 PROCEDURE — 1101F PT FALLS ASSESS-DOCD LE1/YR: CPT | Performed by: INTERNAL MEDICINE

## 2022-04-18 PROCEDURE — G8510 SCR DEP NEG, NO PLAN REQD: HCPCS | Performed by: INTERNAL MEDICINE

## 2022-04-18 PROCEDURE — G0439 PPPS, SUBSEQ VISIT: HCPCS | Performed by: INTERNAL MEDICINE

## 2022-04-18 PROCEDURE — G8752 SYS BP LESS 140: HCPCS | Performed by: INTERNAL MEDICINE

## 2022-04-18 PROCEDURE — 2022F DILAT RTA XM EVC RTNOPTHY: CPT | Performed by: INTERNAL MEDICINE

## 2022-04-18 PROCEDURE — G8754 DIAS BP LESS 90: HCPCS | Performed by: INTERNAL MEDICINE

## 2022-04-18 PROCEDURE — 3017F COLORECTAL CA SCREEN DOC REV: CPT | Performed by: INTERNAL MEDICINE

## 2022-04-18 PROCEDURE — G8427 DOCREV CUR MEDS BY ELIG CLIN: HCPCS | Performed by: INTERNAL MEDICINE

## 2022-04-18 PROCEDURE — G8419 CALC BMI OUT NRM PARAM NOF/U: HCPCS | Performed by: INTERNAL MEDICINE

## 2022-04-18 PROCEDURE — G8536 NO DOC ELDER MAL SCRN: HCPCS | Performed by: INTERNAL MEDICINE

## 2022-04-18 NOTE — PATIENT INSTRUCTIONS
Medicare Wellness Visit, Male    The best way to live healthy is to have a lifestyle where you eat a well-balanced diet, exercise regularly, limit alcohol use, and quit all forms of tobacco/nicotine, if applicable. Regular preventive services are another way to keep healthy. Preventive services (vaccines, screening tests, monitoring & exams) can help personalize your care plan, which helps you manage your own care. Screening tests can find health problems at the earliest stages, when they are easiest to treat. [facility] follows the current, evidence-based guidelines published by the [facility] ([facility]) when recommending preventive services for our patients. Because we follow these guidelines, sometimes recommendations change over time as research supports it. (For example, a prostate screening blood test is no longer routinely recommended for men with no symptoms). Of course, you and your doctor may decide to screen more often for some diseases, based on your risk and co-morbidities (chronic disease you are already diagnosed with). Preventive services for you include:  - Medicare offers their members a free annual wellness visit, which is time for you and your primary care provider to discuss and plan for your preventive service needs. Take advantage of this benefit every year!  -All adults over age 72 should receive the recommended pneumonia vaccines. Current USPSTF guidelines recommend a series of two vaccines for the best pneumonia protection.   -All adults should have a flu vaccine yearly and tetanus vaccine every 10 years.  -All adults age 48 and older should receive the shingles vaccines (series of two vaccines).        -All adults age 38-68 who are overweight should have a diabetes screening test once every three years.   -Other screening tests & preventive services for persons with diabetes include: an eye exam to screen for diabetic retinopathy, a kidney function test, a foot exam, and stricter control over your cholesterol.   -Cardiovascular screening for adults with routine risk involves an electrocardiogram (ECG) at intervals determined by the provider.   -Colorectal cancer screening should be done for adults age 54-65 with no increased risk factors for colorectal cancer. There are a number of acceptable methods of screening for this type of cancer. Each test has its own benefits and drawbacks. Discuss with your provider what is most appropriate for you during your annual wellness visit. The different tests include: colonoscopy (considered the best screening method), a fecal occult blood test, a fecal DNA test, and sigmoidoscopy.  -All adults born between [facility] should be screened once for Hepatitis C.  -An Abdominal Aortic Aneurysm (AAA) Screening is recommended for men age 73-68 who has ever smoked in their lifetime.      Here is a list of your current Health Maintenance items (your personalized list of preventive services) with a due date:  Health Maintenance Due   Topic Date Due    Shingles Vaccine (1 of 2) Never done    Colorectal Screening  11/04/2020    Eye Exam  10/17/2021

## 2022-04-18 NOTE — PROGRESS NOTES
Chief Complaint   Patient presents with   [facility]     Reviewed record in preparation for visit and have obtained necessary documentation. Identified pt with two pt identifiers(name and ). Health Maintenance Due   Topic    Shingrix Vaccine Age 49> (1 of 2)    Colorectal Cancer Screening Combo     Eye Exam Retinal or Dilated          Chief Complaint   Patient presents with   Meaghan Current Annual Wellness Visit        Wt Readings from Last 3 Encounters:   22 187 lb 3.2 oz (84.9 kg)   22 189 lb (85.7 kg)   22 187 lb 3.2 oz (84.9 kg)     Temp Readings from Last 3 Encounters:   22 97.5 °F (36.4 °C) (Temporal)   22 98.6 °F (37 °C) (Temporal)   20 97.8 °F (36.6 °C)     BP Readings from Last 3 Encounters:   22 129/64   22 137/80   22 124/64     Pulse Readings from Last 3 Encounters:   22 (!) 58   22 79   22 72           Learning Assessment:  :     Learning Assessment 2017 2016 3/26/2014   PRIMARY LEARNER Patient Patient Patient   HIGHEST LEVEL OF EDUCATION - PRIMARY LEARNER  - - 4 YEARS OF COLLEGE   BARRIERS PRIMARY LEARNER - - NONE   CO-LEARNER CAREGIVER - - No   PRIMARY LANGUAGE ENGLISH ENGLISH ENGLISH   LEARNER PREFERENCE PRIMARY OTHER (COMMENT) LISTENING OTHER (COMMENT)   ANSWERED BY patient Patient self   RELATIONSHIP SELF SELF SELF       Depression Screening:  :     3 most recent PHQ Screens 2022   Little interest or pleasure in doing things Not at all   Feeling down, depressed, irritable, or hopeless Not at all   Total Score PHQ 2 0       Fall Risk Assessment:  :     Fall Risk Assessment, last 12 mths 2022   Able to walk? Yes   Fall in past 12 months? 0   Do you feel unsteady? 0   Are you worried about falling 0   Number of falls in past 12 months -   Fall with injury? -       Abuse Screening:  :     Abuse Screening Questionnaire 2022   Do you ever feel afraid of your partner?  N   Are you in a relationship with someone who physically or mentally threatens you? N   Is it safe for you to go home? Y       Coordination of Care Questionnaire:  :     1) Have you been to an emergency room, urgent care clinic since your last visit? no   Hospitalized since your last visit? no             2) Have you seen or consulted any other health care providers outside of [address] [place] [address] since your last visit? no  (Include any pap smears or colon screenings in this section.)    3) Do you have an Advance Directive on file? no    4) Are you interested in receiving information on Advance Directives? NO      Patient is accompanied by self I have received verbal consent from Oseas Ken to discuss any/all medical information while they are present in the room. Reviewed record  In preparation for visit and have obtained necessary documentation.

## 2022-04-18 NOTE — PROGRESS NOTES
This is the Subsequent Medicare Annual Wellness Exam, performed 12 months or more after the Initial AWV or the last Subsequent AWV    I have reviewed the patient's medical history in detail and updated the computerized patient record. Assessment/Plan   Education and counseling provided:  Are appropriate based on today's review and evaluation    1. Medicare annual wellness visit, subsequent  2. Essential hypertension, benign  3. Hyperlipidemia LDL goal <100  4. Type 2 diabetes with nephropathy ([facility] [address]  5. Vitamin B 12 deficiency  6. Neuropathy       Depression Risk Factor Screening     3 most recent PHQ Screens 4/18/2022   Little interest or pleasure in doing things Not at all   Feeling down, depressed, irritable, or hopeless Not at all   Total Score PHQ 2 0       Alcohol & Drug Abuse Risk Screen    Do you average more than 1 drink per night or more than 7 drinks a week: No- 2 to 3 per yr    In the past three months have you have had more than 4 drinks containing alcohol on one occasion: No          Functional Ability and Level of Safety    Hearing: Hearing is good. Activities of Daily Living: The home contains: no safety equipment. Patient does total self care      Ambulation: with no difficulty     Fall Risk:  Fall Risk Assessment, last 12 mths 4/18/2022   Able to walk? Yes   Fall in past 12 months? 0   Do you feel unsteady? 0   Are you worried about falling 0   Number of falls in past 12 months -   Fall with injury?  -      Abuse Screen:  Patient is not abused       Cognitive Screening    Has your family/caregiver stated any concerns about your memory: no         Health Maintenance Due     Health Maintenance Due   Topic Date Due    Shingrix Vaccine Age 49> (1 of 2) Never done    Colorectal Cancer Screening Combo  11/04/2020    Eye Exam Retinal or Dilated  10/17/2021       Patient Care Team   Patient Care Team:  Benjy Keating MD as PCP - General  Benjy Keating MD as PCP - [address] Empaneled Provider  Kalen Jackson MD as Consulting Provider (Endocrinology)  Brina Ferrell MD as Surgeon (General Surgery)    History     Patient Active Problem List   Diagnosis Code    Hyperlipidemia LDL goal <100 E78.5    Allergic rhinitis, cause unspecified J30.9    Unspecified sleep apnea G47.30    Benign neoplasm of colon D12.6    Cervical stenosis of spine M48.02    Encounter for long-term (current) use of other medications Z79.899    Essential hypertension, benign I10    Advance directive discussed with patient Z71.89    Type 2 diabetes with nephropathy ([facility] [address] E11.21     Past Medical History:   Diagnosis Date    Allergic rhinitis, cause unspecified     Diabetes ([address]     Hypercholesterolemia     Hypertension     Rectal polyp     Unspecified sleep apnea     doesn't use CPAP very often      Past Surgical History:   Procedure Laterality Date    ENDOSCOPY, COLON, DIAGNOSTIC      08,     HX CERVICAL FUSION      HX ENDOSCOPY  2015    HX PREMALIG/BENIGN SKIN LESION EXCISION  3/14/2016    excision of Rt buttofk inflammatory mass     Current Outpatient Medications   Medication Sig Dispense Refill    glipiZIDE (GLUCOTROL) 5 mg tablet TAKE 1 TAB BEFORE BREAKFAST AND 2 TABS BEFORE DINNER 270 Tablet 3    losartan (COZAAR) 100 mg tablet TAKE 1 TABLET DAILY 90 Tablet 3    empagliflozin-metformin (Synjardy XR) 12.5-1,000 mg TBph TAKE 2 TABLETS ONCE DAILY 180 Each 3    latanoprost (XALATAN) 0.005 % ophthalmic solution Administer 1 Drop to both eyes nightly.  Berb Johns/herbal complex no.18 (BERBERINE-HERBAL COMB NO.18 PO) Take 500 mg by mouth daily.  atorvastatin (LIPITOR) 10 mg tablet Take 1 Tab by mouth daily. 90 Tab 3    ONETOUCH ULTRA BLUE TEST STRIP strip TEST ONCE DAILY 50 Strip 11    b complex vitamins tablet Take 1 Tab by mouth daily.  Lancets (ONE TOUCH ULTRASOFT LANCETS) Misc by Does Not Apply route. Check blood sugar as needed.  1 Package 11     Allergies   Allergen Reactions    Ace Inhibitors Cough    Cialis [Tadalafil] Other (comments)     Back pain    Onglyza [Saxagliptin] Other (comments)     headache       Family History   Problem Relation Age of Onset    Heart Attack Mother     Diabetes Mother     Heart Disease Mother     Coronary Art Dis Sister     Heart Disease Sister         CAD    Diabetes Sister      Social History     Tobacco Use    Smoking status: Never Smoker    Smokeless tobacco: Never Used   Substance Use Topics    Alcohol use: Yes     Comment: rarely wine every 2-3 yearly         Lyla Narvaez MD

## 2022-04-18 NOTE — PROGRESS NOTES
HISTORY OF PRESENT ILLNESS  Minoo Polanco is a 77 y.o. male. HPI  Assessment:  Aaron Gomez is seen today for a Medicare wellness visit and follow up of chronic problems. 1. Preventive care. See attached wellness visit note. He is due for select lab studies, the shingles vaccine, colonoscopy and eye exam.    He is up to date with most labs with his endocrinologist and other vaccinations. 2. Chronic problems are reviewed. He follows up closely with Dr. Rell Caballero for diabetes management, as well as cholesterol. Review of Systems   Constitutional: Negative for weight loss. HENT: Negative for hearing loss. Respiratory: Negative. Cardiovascular: Negative for chest pain, palpitations, leg swelling and PND. Gastrointestinal: Positive for diarrhea. Believes due to metformin   Genitourinary: Positive for frequency. Musculoskeletal: Negative for myalgias. Neurological: Negative for focal weakness. Physical Exam  Vitals and nursing note reviewed. Constitutional:       General: He is not in acute distress. Appearance: He is well-developed. HENT:      Head: Normocephalic and atraumatic. Right Ear: Tympanic membrane, ear canal and external ear normal.      Left Ear: Tympanic membrane, ear canal and external ear normal.   Eyes:      General:         Right eye: No discharge. Left eye: No discharge. Pupils: Pupils are equal, round, and reactive to light. Neck:      Thyroid: No thyromegaly. Vascular: No carotid bruit. Cardiovascular:      Rate and Rhythm: Normal rate and regular rhythm. Heart sounds: Normal heart sounds. No murmur heard. No friction rub. No gallop. Pulmonary:      Effort: Pulmonary effort is normal. No respiratory distress. Breath sounds: Normal breath sounds. No wheezing or rales. Abdominal:      General: Bowel sounds are normal. There is no distension. Palpations: Abdomen is soft. There is no mass. Tenderness:  There is no abdominal tenderness. There is no guarding or rebound. Musculoskeletal:         General: No tenderness. Normal range of motion. Cervical back: Normal range of motion and neck supple. Lymphadenopathy:      Cervical: No cervical adenopathy. Skin:     General: Skin is warm and dry. Findings: No rash. Neurological:      Mental Status: He is alert and oriented to person, place, and time. Deep Tendon Reflexes: Reflexes are normal and symmetric. Psychiatric:         Behavior: Behavior normal.         ASSESSMENT and PLAN  Diagnoses and all orders for this visit:    1. Medicare annual wellness visit, subsequent    2. Essential hypertension, benign  -     CBC WITH AUTOMATED DIFF; Future    3. Hyperlipidemia LDL goal <100    4. Type 2 diabetes with nephropathy (HCC)  -     URINALYSIS W/ RFLX MICROSCOPIC; Future    5. Neuropathy    6. Screening for prostate cancer  -     PSA SCREENING (SCREENING); Future    7.  Adenomatous polyp of colon, unspecified part of colon  -     REFERRAL TO GASTROENTEROLOGY

## 2022-04-19 LAB
APPEARANCE UR: CLEAR
BASOPHILS # BLD: 0.1 K/UL (ref 0–0.1)
BASOPHILS NFR BLD: 1 % (ref 0–1)
BILIRUB UR QL: NEGATIVE
COLOR UR: ABNORMAL
DIFFERENTIAL METHOD BLD: NORMAL
EOSINOPHIL # BLD: 0.1 K/UL (ref 0–0.4)
EOSINOPHIL NFR BLD: 2 % (ref 0–7)
ERYTHROCYTE [DISTWIDTH] IN BLOOD BY AUTOMATED COUNT: 13.9 % (ref 11.5–14.5)
GLUCOSE UR STRIP.AUTO-MCNC: >1000 MG/DL
HCT VFR BLD AUTO: 47.3 % (ref 36.6–50.3)
HGB BLD-MCNC: 15.7 G/DL (ref 12.1–17)
HGB UR QL STRIP: NEGATIVE
IMM GRANULOCYTES # BLD AUTO: 0 K/UL (ref 0–0.04)
IMM GRANULOCYTES NFR BLD AUTO: 0 % (ref 0–0.5)
KETONES UR QL STRIP.AUTO: NEGATIVE MG/DL
LEUKOCYTE ESTERASE UR QL STRIP.AUTO: NEGATIVE
LYMPHOCYTES # BLD: 1.4 K/UL (ref 0.8–3.5)
LYMPHOCYTES NFR BLD: 23 % (ref 12–49)
MCH RBC QN AUTO: 30.1 PG (ref 26–34)
MCHC RBC AUTO-ENTMCNC: 33.2 G/DL (ref 30–36.5)
MCV RBC AUTO: 90.6 FL (ref 80–99)
MONOCYTES # BLD: 0.6 K/UL (ref 0–1)
MONOCYTES NFR BLD: 10 % (ref 5–13)
NEUTS SEG # BLD: 4 K/UL (ref 1.8–8)
NEUTS SEG NFR BLD: 64 % (ref 32–75)
NITRITE UR QL STRIP.AUTO: NEGATIVE
NRBC # BLD: 0 K/UL (ref 0–0.01)
NRBC BLD-RTO: 0 PER 100 WBC
PH UR STRIP: 5 [PH] (ref 5–8)
PLATELET # BLD AUTO: 204 K/UL (ref 150–400)
PMV BLD AUTO: 11 FL (ref 8.9–12.9)
PROT UR STRIP-MCNC: NEGATIVE MG/DL
PSA SERPL-MCNC: 3 NG/ML (ref 0.01–4)
RBC # BLD AUTO: 5.22 M/UL (ref 4.1–5.7)
SP GR UR REFRACTOMETRY: 1.02 (ref 1–1.03)
UROBILINOGEN UR QL STRIP.AUTO: 0.2 EU/DL (ref 0.2–1)
WBC # BLD AUTO: 6.2 K/UL (ref 4.1–11.1)

## 2022-04-22 RX ORDER — ATORVASTATIN CALCIUM 10 MG/1
TABLET, FILM COATED ORAL
Qty: 90 TABLET | Refills: 3 | Status: SHIPPED | OUTPATIENT
Start: 2022-04-22

## 2022-04-24 ENCOUNTER — TELEPHONE (OUTPATIENT)
Dept: INTERNAL MEDICINE CLINIC | Age: 77
End: 2022-04-24

## 2022-04-24 NOTE — TELEPHONE ENCOUNTER
Reviewed lab - psa has increased more than expected.  Advised See urologist as directed, names are provided and he'll schedule

## 2022-05-17 ENCOUNTER — TELEPHONE (OUTPATIENT)
Dept: ENDOCRINOLOGY | Age: 77
End: 2022-05-17

## 2022-12-28 DIAGNOSIS — E11.21 TYPE 2 DIABETES WITH NEPHROPATHY (HCC): ICD-10-CM

## 2022-12-28 DIAGNOSIS — I10 ESSENTIAL HYPERTENSION, BENIGN: ICD-10-CM

## 2022-12-28 DIAGNOSIS — E78.5 HYPERLIPIDEMIA LDL GOAL <100: ICD-10-CM

## 2023-01-10 ENCOUNTER — OFFICE VISIT (OUTPATIENT)
Dept: ENDOCRINOLOGY | Age: 78
End: 2023-01-10
Payer: COMMERCIAL

## 2023-01-10 VITALS
HEART RATE: 66 BPM | WEIGHT: 190.6 LBS | HEIGHT: 68 IN | DIASTOLIC BLOOD PRESSURE: 70 MMHG | BODY MASS INDEX: 28.89 KG/M2 | SYSTOLIC BLOOD PRESSURE: 138 MMHG

## 2023-01-10 DIAGNOSIS — I10 ESSENTIAL HYPERTENSION, BENIGN: ICD-10-CM

## 2023-01-10 DIAGNOSIS — E11.21 TYPE 2 DIABETES WITH NEPHROPATHY (HCC): Primary | ICD-10-CM

## 2023-01-10 DIAGNOSIS — E78.5 HYPERLIPIDEMIA LDL GOAL <100: ICD-10-CM

## 2023-01-10 PROCEDURE — 3078F DIAST BP <80 MM HG: CPT | Performed by: INTERNAL MEDICINE

## 2023-01-10 PROCEDURE — 3075F SYST BP GE 130 - 139MM HG: CPT | Performed by: INTERNAL MEDICINE

## 2023-01-10 PROCEDURE — 3051F HG A1C>EQUAL 7.0%<8.0%: CPT | Performed by: INTERNAL MEDICINE

## 2023-01-10 PROCEDURE — 1123F ACP DISCUSS/DSCN MKR DOCD: CPT | Performed by: INTERNAL MEDICINE

## 2023-01-10 PROCEDURE — 99214 OFFICE O/P EST MOD 30 MIN: CPT | Performed by: INTERNAL MEDICINE

## 2023-01-10 RX ORDER — TAMSULOSIN HYDROCHLORIDE 0.4 MG/1
CAPSULE ORAL
COMMUNITY
Start: 2022-11-26

## 2023-01-10 NOTE — PROGRESS NOTES
Chief Complaint   Patient presents with    Diabetes     PCP and pharmacy confirmed      History of Present Illness: Ursula Mejia is a 69 y.o. male here for follow up of diabetes. Weight up 3 lbs since last visit in 1/22. No major change to health since last visit. Still taking 1 synjardy even though it's written as 2 daily for cost and still taking glipizide and berberine as directed. Fasting sugar was 163 today and has seen down to 105 at the lowest and only over 200 twice when he had more carbs the night before. Compliant with lipitor and losartan. Cancelled his visit with Dr. Gian Porter and plans to reschedule. Does have issues with neuropathy despite b-complex vitamin but does not want to take any prescription meds at this time. Current Outpatient Medications   Medication Sig    FOLIC ACID PO Take  by mouth daily. atorvastatin (LIPITOR) 10 mg tablet TAKE 1 TABLET DAILY    glipiZIDE (GLUCOTROL) 5 mg tablet TAKE 1 TAB BEFORE BREAKFAST AND 2 TABS BEFORE DINNER    losartan (COZAAR) 100 mg tablet TAKE 1 TABLET DAILY    empagliflozin-metformin (Synjardy XR) 12.5-1,000 mg TBph TAKE 2 TABLETS ONCE DAILY    latanoprost (XALATAN) 0.005 % ophthalmic solution Administer 1 Drop to both eyes nightly. Berb Johns/herbal complex no.18 (BERBERINE-HERBAL COMB NO.18 PO) Take 500 mg by mouth daily. ONETOUCH ULTRA BLUE TEST STRIP strip TEST ONCE DAILY    b complex vitamins tablet Take 1 Tab by mouth daily. Lancets (ONE TOUCH ULTRASOFT LANCETS) Misc by Does Not Apply route. Check blood sugar as needed. tamsulosin (FLOMAX) 0.4 mg capsule TAKE 1 CAPSULE BY MOUTH EVERYDAY AT BEDTIME     No current facility-administered medications for this visit.      Allergies   Allergen Reactions    Ace Inhibitors Cough    Cialis [Tadalafil] Other (comments)     Back pain    Onglyza [Saxagliptin] Other (comments)     headache     Review of Systems: PER HPI    Physical Examination:  Blood pressure 138/70, pulse 66, height 5' 7.72\" (1.72 m), weight 190 lb 9.6 oz (86.5 kg). General: pleasant, no distress, good eye contact   Neck: no carotid bruits  Cardiovascular: regular, normal rate, nl s1 and s2, no m/r/g,   Respiratory: clear bilaterally  Integumentary: no edema,   Psychiatric: normal mood and affect    Diabetic foot exam:     Left Foot:   Visual Exam: callous - moderate   Pulse DP: 2+ (normal)   Filament test: reduced sensation    Vibratory sensation: diminished      Right Foot:   Visual Exam: callous - moderate   Pulse DP: 2+ (normal)   Filament test: reduced sensation    Vibratory sensation: diminished      Data Reviewed:   Component      Latest Ref Rng & Units 1/6/2023 1/6/2023 1/6/2023 1/6/2023           9:04 AM  9:04 AM  9:04 AM  9:04 AM   Glucose      70 - 99 mg/dL  153 (H)     BUN      8 - 27 mg/dL  11     Creatinine      0.76 - 1.27 mg/dL  1.03     eGFR      >59 mL/min/1.73  75     BUN/Creatinine ratio      10 - 24  11     Sodium      134 - 144 mmol/L  146 (H)     Potassium      3.5 - 5.2 mmol/L  4.6     Chloride      96 - 106 mmol/L  109 (H)     CO2      20 - 29 mmol/L  23     Calcium      8.6 - 10.2 mg/dL  8.9     Protein, total      6.0 - 8.5 g/dL  7.0     Albumin      3.7 - 4.7 g/dL  4.4     GLOBULIN, TOTAL      1.5 - 4.5 g/dL  2.6     A-G Ratio      1.2 - 2.2  1.7     Bilirubin, total      0.0 - 1.2 mg/dL  0.7     Alk. phosphatase      44 - 121 IU/L  138 (H)     AST      0 - 40 IU/L  16     ALT      0 - 44 IU/L  15     Cholesterol, total      100 - 199 mg/dL    122   Triglyceride      0 - 149 mg/dL    77   HDL Cholesterol      >39 mg/dL    31 (L)   VLDL, calculated      5 - 40 mg/dL    16   LDL, calculated      0 - 99 mg/dL    75   Creatinine, urine random      Not Estab. mg/dL   41.4    Microalbumin, urine      Not Estab. ug/mL   10.9    Microalbumin/Creat.  Ratio      0 - 29 mg/g creat   26    Hemoglobin A1c, (calculated)      4.8 - 5.6 % 7.5 (H)      Estimated average glucose      mg/dL 169          Assessment/Plan: 1. DM w/ Complication: his most recent Hgb A1c was 7.5% in 1/23 up from 7.2% in 1/22 up from 6.4% in 6/21 down from 7% in 12/20 down from 8.2% in 2/20 up from 8% in 7/19 down from 8.1% in 2/19 up from 7.8% in 9/18 down from 8.2% in 3/18 up from 7.3% in 9/17 down from 7.8% in 4/17 up from 7.7% in 11/16 up from 7.2% in 6/16 up from 6.9% in 10/15 stable from 6/15 down from 8.9% in 2/15 up from 7.9% in 10/14 down from 8% in 6/14 up from 7.8% in 2/14 up from 7.4% in 10/13 down from 8.9% in June up from 8.7% in Feb 2013 up from 7.2% in Oct stable from July down from 7.6% in March 2012. A1c is at goal of 7-7.5% so no changes needed. - cont berberine 500 mg daily  - cont synjardy XR 12.5/1000 mg 1 tab at dinner only (written as 2 tabs to help with cost)  - cont glipizide 5 mg in am and 10 mg before dinner  - check bs 1 time per day and focus on post-meal readings  - foot exam done 1/23  - optho UTD 10/19  - microalbumin nl 7/12, up to 49 in 6/13, down to 20 in 10/13, up to 44 in 10/14 so increased diovan to 160 mg daily but still 44 in 2/15. Down to 16 in 6/15 with better A1c but up to 39 in 10/15 and 47 in 6/16. Down to 32 in 11/16. Up to 42 in 9/17 so increased diovan to 320 but up to 68 in 3/18 with higher A1c, down to 19.2 in 9/18 and 10.8 in 7/19 with lower A1c and up to 38 in 12/20 and 41 in 1/22, down to 26 in 1/23  - check Hgb A1c and cmp and microalbumin prior to next visit        2. Unspecified essential hypertension: his BP was at goal < 140/90   - cont losartan 100 mg daily   - monitor home blood pressure readings        3. Other and unspecified hyperlipidemia: Given DM, Goal LDL < 100, non-HDL < 130, and TG < 150. LDL 68 in Feb 2013 and 67 in 10/13 and 66 in 6/14 and 65 in 2/15 and 67 in 10/15 and 71 in 6/16 and 61 in 4/17 on 20 mg of lipitor. I decreased to 10 mg and LDL still 69 in 9/17 and 72 in 9/18 and 78 in 7/19 and 77 in 12/20 and 71 in 1/22 and 75 in 1/23 so will stay on this dose.   - cont lipitor 10 mg daily  - check lipids prior to next visit              Patient Instructions   1) Your Hemoglobin A1c (3 month test of blood sugar) was 7.5% up slightly from 7.2% but still at goal of 7.5% or less. 2) BUN and creatinine are markers of kidney function. Your values are normal.  Your urine protein is back to normal.    3) ALT and AST are markers of liver function. Your values are normal.    4) Your blood pressure and cholesterol are controlled. 5) Let me know when you need refills. Follow-up and Dispositions    Return in about 1 year (around 1/10/2024).                Copy sent to:  Telly Lyman MD

## 2023-01-10 NOTE — PATIENT INSTRUCTIONS
1) Your Hemoglobin A1c (3 month test of blood sugar) was 7.5% up slightly from 7.2% but still at goal of 7.5% or less. 2) BUN and creatinine are markers of kidney function. Your values are normal.  Your urine protein is back to normal.    3) ALT and AST are markers of liver function. Your values are normal.    4) Your blood pressure and cholesterol are controlled. 5) Let me know when you need refills. No

## 2023-01-11 RX ORDER — ATORVASTATIN CALCIUM 10 MG/1
TABLET, FILM COATED ORAL
Qty: 90 TABLET | Refills: 3 | Status: SHIPPED | OUTPATIENT
Start: 2023-01-11

## 2023-01-11 RX ORDER — LOSARTAN POTASSIUM 100 MG/1
TABLET ORAL
Qty: 90 TABLET | Refills: 3 | Status: SHIPPED | OUTPATIENT
Start: 2023-01-11

## 2023-01-19 RX ORDER — GLIPIZIDE 5 MG/1
TABLET ORAL
Qty: 270 TABLET | Refills: 3 | Status: SHIPPED | OUTPATIENT
Start: 2023-01-19

## 2023-03-06 RX ORDER — EMPAGLIFLOZIN, METFORMIN HYDROCHLORIDE 12.5; 1 MG/1; MG/1
TABLET, EXTENDED RELEASE ORAL
Qty: 180 EACH | Refills: 3 | Status: SHIPPED | OUTPATIENT
Start: 2023-03-06

## 2023-04-21 ENCOUNTER — PATIENT MESSAGE (OUTPATIENT)
Dept: SLEEP MEDICINE | Age: 78
End: 2023-04-21

## 2023-04-21 ENCOUNTER — OFFICE VISIT (OUTPATIENT)
Dept: INTERNAL MEDICINE CLINIC | Age: 78
End: 2023-04-21

## 2023-04-21 VITALS
BODY MASS INDEX: 29.76 KG/M2 | HEIGHT: 67 IN | HEART RATE: 72 BPM | SYSTOLIC BLOOD PRESSURE: 138 MMHG | TEMPERATURE: 98.7 F | RESPIRATION RATE: 20 BRPM | WEIGHT: 189.6 LBS | OXYGEN SATURATION: 97 % | DIASTOLIC BLOOD PRESSURE: 76 MMHG

## 2023-04-21 DIAGNOSIS — G62.9 NEUROPATHY: ICD-10-CM

## 2023-04-21 DIAGNOSIS — E11.21 TYPE 2 DIABETES WITH NEPHROPATHY (HCC): ICD-10-CM

## 2023-04-21 DIAGNOSIS — Z12.11 COLON CANCER SCREENING: ICD-10-CM

## 2023-04-21 DIAGNOSIS — Z00.00 WELL ADULT ON ROUTINE HEALTH CHECK: Primary | ICD-10-CM

## 2023-04-21 DIAGNOSIS — G47.33 OSA (OBSTRUCTIVE SLEEP APNEA): ICD-10-CM

## 2023-04-21 DIAGNOSIS — R97.20 PSA ELEVATION: ICD-10-CM

## 2023-04-21 DIAGNOSIS — K21.9 GASTROESOPHAGEAL REFLUX DISEASE, UNSPECIFIED WHETHER ESOPHAGITIS PRESENT: ICD-10-CM

## 2023-04-21 DIAGNOSIS — I10 ESSENTIAL HYPERTENSION, BENIGN: ICD-10-CM

## 2023-04-21 RX ORDER — OMEPRAZOLE 40 MG/1
40 CAPSULE, DELAYED RELEASE ORAL DAILY
Qty: 90 CAPSULE | Refills: 3 | Status: SHIPPED | OUTPATIENT
Start: 2023-04-21

## 2023-04-21 NOTE — PROGRESS NOTES
Vini Spence is a 69y.o. year old male who is a new patient to me today (04/21/23). He was previous followed by Dr Leah Gomez. Assessment & Plan:   1. Well adult on routine health check  Reviewed diet and exercise habits - he does well with diet and is physically active in a more passive manner. Updated health maintenance, see below. Check screening labs. -     CBC W/O DIFF; Future  2. Neuropathy  Assessment & Plan:  Suspect this is due to diabetes. He feels it is becoming more bothersome. Offered gabapentin or capsacin cream. He is not interested in medications at this time. 3. Type 2 diabetes with nephropathy [facility])  Assessment & Plan:  Continue to follow with Dr Daiana Conte for medication management    4. Essential hypertension, benign  Assessment & Plan:  Controlled, cont losartan. Prescribed by endocrinology. 5. Gastroesophageal reflux disease, unspecified whether esophagitis present  Assessment & Plan:  I suspect this is the reason for his chronic cough. He should restart PPI and let me know what is active. Orders:  -     omeprazole (PRILOSEC) 40 mg capsule; Take 1 Capsule by mouth daily. , Normal, Disp-90 Capsule, R-3  6. CAROLINE (obstructive sleep apnea)  Assessment & Plan:  We dicussed increased that CAROLINE contributes to exacerbations of chronic disease, along with increased morbidity and mortality. He is open to seeing sleep medicine specialist, has not been seen in years and is not wearing CPAP. Orders:  -     SLEEP MEDICINE REFERRAL  7. PSA elevation  Assessment & Plan:  Referred to urology last year after PSA doubled. He will see Dr Camilla Ortez in the fall for repeat PSA.     8. Colon cancer screening  -     REFERRAL TO GASTROENTEROLOGY     Health Maintenance   Flu vaccine:  COVID vaccine: discussed bivalent booster   Tetanus vaccine: 3/2015  Shingles vaccine: he reports shingles vaccines  Pneumonia vaccine: UTD  Colon cancer screening: will refer   PSA: per urology   Hep C: 9/2017  Lipid: per endo  DM: per endo   Healthcare decision maker: wife  ACP: defers       RTC: 6 mo     Subjective:   Milagro Batista was seen today for wumo Visit    He feels well today. Only new concern is neuropathy    T2DM   - Endo Dr Eugene Spicer  - metformin-empagliflozin, glipizide  - atorvastatin     Neuropathy in his feet is becoming more bothersome - \"all night every night\" and sometimes during the day. Feels like tingling/numbness with pain 3-4/10s. Toes are involved and 1-2 in of the foot. - he has been using frankincense and myrrh oil    HTN  - losartan  - home SBP usually 130    Elevated PSA - PSA velocity was increasing  - Urology Dr Hans Avery, has follow-up in the fall   - flomax    CAROLINE  - hasnt work CPAP in years due to it being \"uncomfortable\"  - he is a bit reluctant to     Cough   - worse at night  - productive   - has been going on for a year  - no SOB, wheezing    Diet - wife monitor this and tries to have him follow diabetic diet    Exercise - does yard work and likes to be outside. Reports doing yard work year round. He doesn't like to exercise but prefers more of an \"activity\". 4-5x/week     Review of Systems   All other systems reviewed and are negative. PMHx    Patient Active Problem List   Diagnosis Code    Hyperlipidemia LDL goal <100 E78.5    Allergic rhinitis, cause unspecified J30.9    CAROLINE (obstructive sleep apnea) G47.33    Benign neoplasm of colon D12.6    Cervical stenosis of spine M48.02    Encounter for long-term (current) use of other medications Z79.899    Essential hypertension, benign I10    Type 2 diabetes with nephropathy ([facility] [address] E11.21    Neuropathy G62.9    PSA elevation R97.20    Gastroesophageal reflux disease, unspecified whether esophagitis present K21.9       Prior to Admission medications    Medication Sig Start Date End Date Taking? Authorizing Provider   omeprazole (PRILOSEC) 40 mg capsule Take 1 Capsule by mouth daily.  4/21/23  Yes Pablito Mattson MD empagliflozin-metformin (Synjardy XR) 12.5-1,000 mg TBph TAKE 2 TABLETS ONCE DAILY 3/6/23  Yes Alma Keller MD   glipiZIDE (GLUCOTROL) 5 mg tablet TAKE 1 TABLET BEFORE BREAKFAST AND 2 TABLETS BEFORE DINNER 1/19/23  Yes Alma Keller MD   losartan (COZAAR) 100 mg tablet TAKE 1 TABLET DAILY 1/11/23  Yes Alma Keller MD   atorvastatin (LIPITOR) 10 mg tablet TAKE 1 TABLET DAILY 1/11/23  Yes Alma Keller MD   tamsulosin (FLOMAX) 0.4 mg capsule TAKE 1 CAPSULE BY MOUTH EVERYDAY AT BEDTIME 11/26/22  Yes Provider, Historical   FOLIC ACID PO Take  by mouth daily. Yes Provider, Historical   latanoprost (XALATAN) 0.005 % ophthalmic solution Administer 1 Drop to both eyes nightly. 1/7/22  Yes Provider, Historical   Berb Johns/herbal complex no.18 (BERBERINE-HERBAL COMB NO.18 PO) Take 500 mg by mouth daily. Yes Provider, Historical   ONETOUCH ULTRA BLUE TEST STRIP strip TEST ONCE DAILY 9/4/18  Yes Alma Keller MD   b complex vitamins tablet Take 1 Tablet by mouth daily. Yes Provider, Historical   Lancets (ONE TOUCH ULTRASOFT LANCETS) Misc by Does Not Apply route. Check blood sugar as needed. 7/6/11  Yes Abby Rivera MD       The following sections were reviewed & updated as appropriate: Problem List, Allergies, PMH, PSH, FH, and SH. Objective:   Visit Vitals  /76   Pulse 72   Temp 98.7 °F (37.1 °C) (Temporal)   Resp 20   Ht 5' 7\" (1.702 m)   Wt 189 lb 9.6 oz (86 kg)   SpO2 97%   BMI 29.70 kg/m²       Physical Exam  Constitutional:       General: He is not in acute distress. Eyes:      Extraocular Movements: Extraocular movements intact. Conjunctiva/sclera: Conjunctivae normal.   Cardiovascular:      Rate and Rhythm: Normal rate and regular rhythm. Heart sounds: No murmur heard. Pulmonary:      Effort: Pulmonary effort is normal. No respiratory distress. Abdominal:      General: Bowel sounds are normal.      Palpations: Abdomen is soft.    Musculoskeletal: Right lower leg: No edema. Left lower leg: No edema. Neurological:      General: No focal deficit present. Mental Status: He is alert.    Psychiatric:         Mood and Affect: Mood normal.         Behavior: Behavior normal.            Aydee Raya MD

## 2023-04-21 NOTE — ASSESSMENT & PLAN NOTE
I suspect this is the reason for his chronic cough. He should restart PPI and let me know what is active.

## 2023-04-21 NOTE — ASSESSMENT & PLAN NOTE
Suspect this is due to diabetes. He feels it is becoming more bothersome. Offered gabapentin or capsacin cream. He is not interested in medications at this time.

## 2023-04-21 NOTE — ASSESSMENT & PLAN NOTE
We dicussed increased that CAROLINE contributes to exacerbations of chronic disease, along with increased morbidity and mortality. He is open to seeing sleep medicine specialist, has not been seen in years and is not wearing CPAP.

## 2023-04-21 NOTE — PATIENT INSTRUCTIONS
Sleep apnea treatments:  CPAP/BiPAP  Oral appliance through the dentist  Inspire device - surgically placed nerve stimulator     Please obtain the following vaccines from your local pharmacy. Please ask your pharmacy to fax our office a copy of the vaccination record. Our fax number is [phone]. - Bivalent COVID booster    Most common reason for cough is allergies or acid reflux.    Flonase for allergies   Acid reflux - Prilosec - take for 6 weeks to see if there is improvement

## 2023-04-21 NOTE — ASSESSMENT & PLAN NOTE
Referred to urology last year after PSA doubled. He will see Dr Megan Daly in the fall for repeat PSA.

## 2023-04-22 DIAGNOSIS — E11.21 TYPE 2 DIABETES WITH NEPHROPATHY (HCC): Primary | ICD-10-CM

## 2023-04-22 DIAGNOSIS — I10 ESSENTIAL HYPERTENSION, BENIGN: ICD-10-CM

## 2023-04-22 DIAGNOSIS — E78.5 HYPERLIPIDEMIA LDL GOAL <100: ICD-10-CM

## 2023-04-23 DIAGNOSIS — E78.5 HYPERLIPIDEMIA LDL GOAL <100: ICD-10-CM

## 2023-04-23 DIAGNOSIS — E11.21 TYPE 2 DIABETES WITH NEPHROPATHY (HCC): Primary | ICD-10-CM

## 2023-04-23 DIAGNOSIS — I10 ESSENTIAL HYPERTENSION, BENIGN: ICD-10-CM

## 2023-06-06 ENCOUNTER — TELEPHONE (OUTPATIENT)
Age: 78
End: 2023-06-06

## 2023-06-06 NOTE — TELEPHONE ENCOUNTER
Pt sent message via [employer] stating Synjardy XR needs a prior authorization. PA initiated through [employer] for  Synjardy XR    Synjardy XR approved 05/07/2023 - 06/05/2024. Pt notified via [address]

## 2023-10-09 PROBLEM — R97.20 PSA ELEVATION: Status: ACTIVE | Noted: 2023-04-21

## 2023-11-06 RX ORDER — GLIPIZIDE 5 MG/1
TABLET ORAL
Qty: 270 TABLET | Refills: 3 | Status: SHIPPED | OUTPATIENT
Start: 2023-11-06

## 2023-12-28 DIAGNOSIS — I10 ESSENTIAL HYPERTENSION, BENIGN: ICD-10-CM

## 2023-12-28 DIAGNOSIS — E78.5 HYPERLIPIDEMIA LDL GOAL <100: ICD-10-CM

## 2023-12-28 DIAGNOSIS — E11.21 TYPE 2 DIABETES WITH NEPHROPATHY (HCC): ICD-10-CM

## 2024-01-08 RX ORDER — LOSARTAN POTASSIUM 100 MG/1
TABLET ORAL
Qty: 90 TABLET | Refills: 3 | Status: SHIPPED | OUTPATIENT
Start: 2024-01-08

## 2024-01-08 RX ORDER — ATORVASTATIN CALCIUM 10 MG/1
TABLET, FILM COATED ORAL
Qty: 90 TABLET | Refills: 3 | Status: SHIPPED | OUTPATIENT
Start: 2024-01-08

## 2024-01-09 LAB
ALBUMIN SERPL-MCNC: 4.5 G/DL (ref 3.8–4.8)
ALBUMIN/CREAT UR: 60 MG/G CREAT (ref 0–29)
ALBUMIN/GLOB SERPL: 1.9 {RATIO} (ref 1.2–2.2)
ALP SERPL-CCNC: 158 IU/L (ref 44–121)
ALT SERPL-CCNC: 14 IU/L (ref 0–44)
AST SERPL-CCNC: 13 IU/L (ref 0–40)
BILIRUB SERPL-MCNC: 0.6 MG/DL (ref 0–1.2)
BUN SERPL-MCNC: 11 MG/DL (ref 8–27)
BUN/CREAT SERPL: 12 (ref 10–24)
CALCIUM SERPL-MCNC: 9 MG/DL (ref 8.6–10.2)
CHLORIDE SERPL-SCNC: 107 MMOL/L (ref 96–106)
CHOLEST SERPL-MCNC: 138 MG/DL (ref 100–199)
CO2 SERPL-SCNC: 23 MMOL/L (ref 20–29)
CREAT SERPL-MCNC: 0.9 MG/DL (ref 0.76–1.27)
CREAT UR-MCNC: 46.5 MG/DL
EGFRCR SERPLBLD CKD-EPI 2021: 88 ML/MIN/1.73
EST. AVERAGE GLUCOSE BLD GHB EST-MCNC: 169 MG/DL
GLOBULIN SER CALC-MCNC: 2.4 G/DL (ref 1.5–4.5)
GLUCOSE SERPL-MCNC: 114 MG/DL (ref 70–99)
HBA1C MFR BLD: 7.5 % (ref 4.8–5.6)
HDLC SERPL-MCNC: 38 MG/DL
IMP & REVIEW OF LAB RESULTS: NORMAL
LDLC SERPL CALC-MCNC: 86 MG/DL (ref 0–99)
Lab: NORMAL
MICROALBUMIN UR-MCNC: 28.1 UG/ML
POTASSIUM SERPL-SCNC: 4.3 MMOL/L (ref 3.5–5.2)
PROT SERPL-MCNC: 6.9 G/DL (ref 6–8.5)
SODIUM SERPL-SCNC: 145 MMOL/L (ref 134–144)
TRIGL SERPL-MCNC: 71 MG/DL (ref 0–149)
VLDLC SERPL CALC-MCNC: 14 MG/DL (ref 5–40)

## 2024-01-11 ENCOUNTER — OFFICE VISIT (OUTPATIENT)
Age: 79
End: 2024-01-11
Payer: COMMERCIAL

## 2024-01-11 VITALS
SYSTOLIC BLOOD PRESSURE: 134 MMHG | BODY MASS INDEX: 29.38 KG/M2 | DIASTOLIC BLOOD PRESSURE: 60 MMHG | HEIGHT: 67 IN | HEART RATE: 80 BPM | WEIGHT: 187.2 LBS

## 2024-01-11 DIAGNOSIS — E11.21 TYPE 2 DIABETES MELLITUS WITH DIABETIC NEPHROPATHY, WITHOUT LONG-TERM CURRENT USE OF INSULIN (HCC): Primary | ICD-10-CM

## 2024-01-11 DIAGNOSIS — I10 ESSENTIAL (PRIMARY) HYPERTENSION: ICD-10-CM

## 2024-01-11 DIAGNOSIS — E78.5 HYPERLIPIDEMIA LDL GOAL <100: ICD-10-CM

## 2024-01-11 PROCEDURE — 1123F ACP DISCUSS/DSCN MKR DOCD: CPT | Performed by: INTERNAL MEDICINE

## 2024-01-11 PROCEDURE — 3051F HG A1C>EQUAL 7.0%<8.0%: CPT | Performed by: INTERNAL MEDICINE

## 2024-01-11 PROCEDURE — 3075F SYST BP GE 130 - 139MM HG: CPT | Performed by: INTERNAL MEDICINE

## 2024-01-11 PROCEDURE — 3078F DIAST BP <80 MM HG: CPT | Performed by: INTERNAL MEDICINE

## 2024-01-11 PROCEDURE — 99214 OFFICE O/P EST MOD 30 MIN: CPT | Performed by: INTERNAL MEDICINE

## 2024-01-11 RX ORDER — OMEPRAZOLE 40 MG/1
40 CAPSULE, DELAYED RELEASE ORAL DAILY
COMMUNITY
Start: 2023-04-21

## 2024-01-11 RX ORDER — BERBERINE CHLOR/SEAWEED/CHROM 500-250 MG
CAPSULE ORAL
COMMUNITY

## 2024-01-11 RX ORDER — VITAMIN B COMPLEX
1 CAPSULE ORAL DAILY
COMMUNITY

## 2024-01-11 NOTE — PROGRESS NOTES
Chief Complaint   Patient presents with    Diabetes     PCP      History of Present Illness: Carrillo Colunga is a 78 y.o. male here for follow up of diabetes.  Weight down 3 lbs since last visit in 1/23.  Was put on omeprazole 40 mg daily by Dr. Cunha in 4/23 to see if this would help with his cough at night but this has persisted despite taking this med and is now occurring during the day.  Does have some congestion and uses a saline nasal spray to help.  Didn't make a f/u visit with her but plans to do so.  Checking sugars 3-4 times a week and has seen 110-140s fasting but rarely up to 160 but this is if he misses his synjardy at night and we agreed to move this to the morning and move the berberine to night as he has been taking the synjardy at night and berberine in the morning.  Also has had more urination at night.  Compliant with lipitor and losartan.  Compliant with BP and lipid regimen.  Still prefers to hold on any meds for neuropathy.  Has been applying frankensense to his feet when they really bother him.      Current Outpatient Medications   Medication Sig    b complex vitamins capsule Take 1 capsule by mouth daily    Berberine Chloride (BERBERINE HCI) 500 MG CAPS Take by mouth 1 tab daily    omeprazole (PRILOSEC) 40 MG delayed release capsule Take 1 capsule by mouth daily    atorvastatin (LIPITOR) 10 MG tablet TAKE 1 TABLET DAILY    losartan (COZAAR) 100 MG tablet TAKE 1 TABLET DAILY    glipiZIDE (GLUCOTROL) 5 MG tablet TAKE 1 TABLET BEFORE BREAKFAST AND 2 TABLETS BEFORE DINNER    FOLIC ACID PO Take by mouth daily    Empagliflozin-metFORMIN HCl ER (SYNJARDY XR) 12.5-1000 MG TB24 TAKE 2 TABLETS ONCE DAILY    latanoprost (XALATAN) 0.005 % ophthalmic solution Apply 1 drop to eye    tamsulosin (FLOMAX) 0.4 MG capsule TAKE 1 CAPSULE BY MOUTH EVERYDAY AT BEDTIME     No current facility-administered medications for this visit.     Allergies   Allergen Reactions    Ace Inhibitors Cough    Saxagliptin Other

## 2024-01-11 NOTE — PATIENT INSTRUCTIONS
1) Your Hemoglobin A1c (3 month test of blood sugar) is 7.5% which is stable and still at goal of 7.5-8% or less.    2) Move the berberine to dinner and take the synjardy in the morning to ensure you don't miss this which also helps with kidney protection as your urine microalbumin (kidney protein) was slightly higher than last year but still lower than in 3/18.    3) BUN and creatinine are markers of kidney function.  Your values are normal.    4) ALT and AST are markers of liver function.  Your values are normal.    5) Your blood pressure was controlled and cholesterol was at goal.    6) I would discuss with Dr. Cunha about your continuing cough as the omeprazole does not seem to be helping and this med can have long term effects on your bones and kidneys so I would recommend stopping this and discussing other options with her.

## 2024-04-22 SDOH — ECONOMIC STABILITY: TRANSPORTATION INSECURITY
IN THE PAST 12 MONTHS, HAS LACK OF TRANSPORTATION KEPT YOU FROM MEETINGS, WORK, OR FROM GETTING THINGS NEEDED FOR DAILY LIVING?: NO

## 2024-04-22 SDOH — HEALTH STABILITY: PHYSICAL HEALTH: ON AVERAGE, HOW MANY DAYS PER WEEK DO YOU ENGAGE IN MODERATE TO STRENUOUS EXERCISE (LIKE A BRISK WALK)?: 6 DAYS

## 2024-04-22 SDOH — ECONOMIC STABILITY: FOOD INSECURITY: WITHIN THE PAST 12 MONTHS, YOU WORRIED THAT YOUR FOOD WOULD RUN OUT BEFORE YOU GOT MONEY TO BUY MORE.: NEVER TRUE

## 2024-04-22 SDOH — HEALTH STABILITY: PHYSICAL HEALTH: ON AVERAGE, HOW MANY MINUTES DO YOU ENGAGE IN EXERCISE AT THIS LEVEL?: 150+ MIN

## 2024-04-22 SDOH — ECONOMIC STABILITY: HOUSING INSECURITY
IN THE LAST 12 MONTHS, WAS THERE A TIME WHEN YOU DID NOT HAVE A STEADY PLACE TO SLEEP OR SLEPT IN A SHELTER (INCLUDING NOW)?: NO

## 2024-04-22 SDOH — ECONOMIC STABILITY: FOOD INSECURITY: WITHIN THE PAST 12 MONTHS, THE FOOD YOU BOUGHT JUST DIDN'T LAST AND YOU DIDN'T HAVE MONEY TO GET MORE.: NEVER TRUE

## 2024-04-22 SDOH — ECONOMIC STABILITY: INCOME INSECURITY: HOW HARD IS IT FOR YOU TO PAY FOR THE VERY BASICS LIKE FOOD, HOUSING, MEDICAL CARE, AND HEATING?: NOT HARD AT ALL

## 2024-04-22 ASSESSMENT — PATIENT HEALTH QUESTIONNAIRE - PHQ9
1. LITTLE INTEREST OR PLEASURE IN DOING THINGS: NOT AT ALL
SUM OF ALL RESPONSES TO PHQ QUESTIONS 1-9: 0
2. FEELING DOWN, DEPRESSED OR HOPELESS: NOT AT ALL
SUM OF ALL RESPONSES TO PHQ QUESTIONS 1-9: 0
SUM OF ALL RESPONSES TO PHQ9 QUESTIONS 1 & 2: 0

## 2024-04-22 ASSESSMENT — LIFESTYLE VARIABLES
HOW OFTEN DO YOU HAVE A DRINK CONTAINING ALCOHOL: 2
HOW OFTEN DO YOU HAVE A DRINK CONTAINING ALCOHOL: MONTHLY OR LESS
HOW OFTEN DO YOU HAVE SIX OR MORE DRINKS ON ONE OCCASION: 1
HOW MANY STANDARD DRINKS CONTAINING ALCOHOL DO YOU HAVE ON A TYPICAL DAY: 1 OR 2
HOW MANY STANDARD DRINKS CONTAINING ALCOHOL DO YOU HAVE ON A TYPICAL DAY: 1

## 2024-04-25 ENCOUNTER — OFFICE VISIT (OUTPATIENT)
Age: 79
End: 2024-04-25
Payer: COMMERCIAL

## 2024-04-25 VITALS
RESPIRATION RATE: 16 BRPM | BODY MASS INDEX: 29.32 KG/M2 | TEMPERATURE: 97.9 F | WEIGHT: 186.8 LBS | HEIGHT: 67 IN | HEART RATE: 62 BPM | DIASTOLIC BLOOD PRESSURE: 90 MMHG | OXYGEN SATURATION: 98 % | SYSTOLIC BLOOD PRESSURE: 167 MMHG

## 2024-04-25 DIAGNOSIS — Z00.00 ROUTINE GENERAL MEDICAL EXAMINATION AT A HEALTH CARE FACILITY: ICD-10-CM

## 2024-04-25 DIAGNOSIS — K21.9 GASTROESOPHAGEAL REFLUX DISEASE, UNSPECIFIED WHETHER ESOPHAGITIS PRESENT: ICD-10-CM

## 2024-04-25 DIAGNOSIS — I10 ESSENTIAL HYPERTENSION, BENIGN: ICD-10-CM

## 2024-04-25 DIAGNOSIS — Z12.11 COLON CANCER SCREENING: ICD-10-CM

## 2024-04-25 DIAGNOSIS — G62.9 NEUROPATHY: ICD-10-CM

## 2024-04-25 DIAGNOSIS — E11.21 TYPE 2 DIABETES WITH NEPHROPATHY (HCC): ICD-10-CM

## 2024-04-25 DIAGNOSIS — Z00.00 ROUTINE GENERAL MEDICAL EXAMINATION AT A HEALTH CARE FACILITY: Primary | ICD-10-CM

## 2024-04-25 DIAGNOSIS — R97.20 PSA ELEVATION: ICD-10-CM

## 2024-04-25 DIAGNOSIS — G47.33 OSA (OBSTRUCTIVE SLEEP APNEA): ICD-10-CM

## 2024-04-25 LAB
ERYTHROCYTE [DISTWIDTH] IN BLOOD BY AUTOMATED COUNT: 13.9 % (ref 11.5–14.5)
HCT VFR BLD AUTO: 45.6 % (ref 36.6–50.3)
HGB BLD-MCNC: 15.6 G/DL (ref 12.1–17)
MCH RBC QN AUTO: 30.1 PG (ref 26–34)
MCHC RBC AUTO-ENTMCNC: 34.2 G/DL (ref 30–36.5)
MCV RBC AUTO: 88 FL (ref 80–99)
NRBC # BLD: 0 K/UL (ref 0–0.01)
NRBC BLD-RTO: 0 PER 100 WBC
PLATELET # BLD AUTO: 185 K/UL (ref 150–400)
PMV BLD AUTO: 10.9 FL (ref 8.9–12.9)
RBC # BLD AUTO: 5.18 M/UL (ref 4.1–5.7)
WBC # BLD AUTO: 7.3 K/UL (ref 4.1–11.1)

## 2024-04-25 PROCEDURE — 99397 PER PM REEVAL EST PAT 65+ YR: CPT | Performed by: STUDENT IN AN ORGANIZED HEALTH CARE EDUCATION/TRAINING PROGRAM

## 2024-04-25 PROCEDURE — 3078F DIAST BP <80 MM HG: CPT | Performed by: STUDENT IN AN ORGANIZED HEALTH CARE EDUCATION/TRAINING PROGRAM

## 2024-04-25 PROCEDURE — 3077F SYST BP >= 140 MM HG: CPT | Performed by: STUDENT IN AN ORGANIZED HEALTH CARE EDUCATION/TRAINING PROGRAM

## 2024-04-25 NOTE — PROGRESS NOTES
Carrillo Colunga is a 78 y.o. year old male who presents today (04/28/24) for annual physical exam.    Assessment & Plan:   1. Routine general medical examination at a health care facility  Reviewed diet and exercise habits - recommend he cut out fruit juice and monitor salt in the diet. He insists he is active. Updated health maintenance, see below. Check screening labs - CMP and lipid done by endo 1/2024.   -     CBC; Future  2. Type 2 diabetes with nephropathy (HCC)  Assessment & Plan:  Followed by endocrinology for medication management. We talked about diet, would be best if he could reduce/eliminate juice from the diet.   3. Neuropathy  Assessment & Plan:  Toes/feet. Not particularly bothersome. He doesn't want to try anything prescription for pain relief. OK to use frankensense oil if he finds this helpful. Can also try topical capsacin and oral alpha lipoic acid for relif    4. Essential hypertension, benign  Assessment & Plan:  BP is high in the office today, however he did have a better reading in endo office 1/2024. I asked him to check BP at home with goal <140/90 and alert me if running high. I gave him some info on DASH diet. Wife does the cooking.    He does have untreated LUCINA which may be contributing  5. Gastroesophageal reflux disease, unspecified whether esophagitis present  Assessment & Plan:  He did not find omeprazole to be helpful but pepto bismol is so he will continue this PRN   6. PSA elevation  Assessment & Plan:  He tells me his is dissatisfied with his last urology encounter but I can't understand why. I encouraged him to return to urology annually, he may see a different provider if he prefers.    7. LUCINA (obstructive sleep apnea)  Assessment & Plan:  Untreated. No interest in returning to sleep medicine right now   8. Colon cancer screening  -     [facility] - Hever Miles MD, Gastroenterology, [place] ([facility])        Health Maintenance   Flu vaccine: recommended in the fall  COVID

## 2024-04-25 NOTE — PATIENT INSTRUCTIONS
Safe neuropathy treatments:  - topical Capsaicin   - alpha lipoic acid     Blood pressure goal less than 140/90 if running high often let me know.    Please obtain the following vaccines from your local pharmacy. Please ask your pharmacy to fax our office a copy of the vaccination record. Our fax number is [phone].   - pneumonia vaccine - prevnar 20  - consider fall respiratory virus vaccines - annual flu, annual COVID, one time RSV vaccine

## 2024-05-30 ENCOUNTER — TELEPHONE (OUTPATIENT)
Age: 79
End: 2024-05-30

## 2024-05-30 NOTE — TELEPHONE ENCOUNTER
PA initiated through [employer] for Synjardy XR 12.5-1000 mg    Synjardy XR approved 04/30/2024 - 05/30/2025. Pt notified via [employer]

## 2024-08-08 RX ORDER — EMPAGLIFLOZIN, METFORMIN HYDROCHLORIDE 12.5; 1 MG/1; MG/1
TABLET, EXTENDED RELEASE ORAL
Qty: 180 TABLET | Refills: 3 | Status: SHIPPED | OUTPATIENT
Start: 2024-08-08

## 2024-10-18 RX ORDER — BIMATOPROST 0.3 MG/ML
1 SOLUTION/ DROPS OPHTHALMIC NIGHTLY
COMMUNITY

## 2024-10-18 NOTE — PROGRESS NOTES
[facility]  Preoperative Instructions        Surgery Date 10/29/24          Time of Arrival to be called after 2pm  Contact: [phone]   On the day of your surgery, please report to Surgical Services Registration Desk and sign in at your designated time.  The Surgery Center is located to the right of the Emergency Room.     2. You must have someone with you to drive you home. You should not drive a car for 24 hours following surgery. Please make arrangements for a friend or family member to stay with you for the first 24 hours after your surgery.    3. Do not have anything to eat or drink (including water, gum, mints, coffee, juice) after midnight 10/29/24.?This may not apply to medications prescribed by your physician. ?(Please note below the special instructions with medications to take the morning of your procedure.)    4. We recommend you do not drink any alcoholic beverages for 24 hours before and after your surgery.    5. Contact your surgeon's office for instructions on the following medications: non-steroidal anti-inflammatory drugs (i.e. Advil, Aleve), vitamins, and supplements. (Some surgeon's will want you to stop these medications prior to surgery and others may allow you to take them)  **If you are currently taking Plavix, Coumadin, Aspirin and/or other blood-thinning agents, contact your surgeon for instructions.** Your surgeon will partner with the physician prescribing these medications to determine if it is safe to stop or if you need to continue taking.  Please do not stop taking these medications without instructions from your surgeon    6. Wear comfortable clothes.  Wear glasses instead of contacts.  Do not bring any money or jewelry. Please bring picture ID, insurance card, and any prearranged co-payment or hospital payment.  Do not wear make-up, particularly mascara the morning of your surgery.  Do not wear nail polish, particularly if you are having foot /hand surgery. 
surgery:  Shower again thoroughly with an antibacterial soap, such as Dial or the soap provided at your preassessment appointment. If needed, ask someone for help to reach all areas of your body. Don’t forget to clean your belly button! Rinse.  With bottle of Hibiclens/Chlorhexidine in hand, turn water off.  Apply Hibiclens antiseptic skin cleanser with a clean, freshly washed washcloth.  Gently apply to your body from chin to toes (except the genital area) and especially the area(s) where your incision(s) will be.  Leave Hibiclens/Chlorhexidine on your skin for at least 20 seconds.     CAUTION: If needed, Hibiclens/chlorhexidine may be used to clean the folds of skin of the legs (such as in the area of the groin) and on your buttocks and hips. However, do not use Hibiclens/Chlorhexidine above the neck or in the genital area (your bottom) or put inside any area of your body.  Turn the water back on and rinse.  Dry gently with a clean, freshly washed towel.  After your shower, do not use any powder, deodorant, perfumes or lotion prior to surgery.  Put on clean, freshly washed clothing.    Tips to help prevent infections after your surgery:  Protect your surgical wound from germs:  Hand washing is the most important thing you and your caregivers can do to prevent infections.  Keep your bandage clean and dry!  Do not touch your surgical wound.  Use clean, freshly washed towels and washcloths every time you shower; do not share bath linens with others.  Until your surgical wound is healed, wear clothing and sleep on bed linens each day that are clean and freshly washed.  Do not allow pets to sleep in your bed with you or touch your surgical wound.  Do not smoke - smoking delays wound healing. This may be a good time to stop smoking.  If you have diabetes, it is important for you to manage your blood sugar levels properly before your surgery as well as after your surgery. Poorly managed blood sugar levels slow down wound

## 2024-10-29 ENCOUNTER — HOSPITAL ENCOUNTER (OUTPATIENT)
Facility: HOSPITAL | Age: 79
Setting detail: OUTPATIENT SURGERY
Discharge: HOME | End: 2024-10-29
Attending: SURGERY | Admitting: SURGERY
Payer: COMMERCIAL

## 2024-10-29 ENCOUNTER — ANESTHESIA EVENT (OUTPATIENT)
Facility: HOSPITAL | Age: 79
End: 2024-10-29
Payer: COMMERCIAL

## 2024-10-29 ENCOUNTER — ANESTHESIA (OUTPATIENT)
Facility: HOSPITAL | Age: 79
End: 2024-10-29
Payer: COMMERCIAL

## 2024-10-29 VITALS
BODY MASS INDEX: 29.41 KG/M2 | DIASTOLIC BLOOD PRESSURE: 71 MMHG | HEIGHT: 67 IN | OXYGEN SATURATION: 93 % | HEART RATE: 59 BPM | WEIGHT: 187.39 LBS | RESPIRATION RATE: 13 BRPM | SYSTOLIC BLOOD PRESSURE: 128 MMHG | TEMPERATURE: 98 F

## 2024-10-29 DIAGNOSIS — G89.18 POST-OP PAIN: Primary | ICD-10-CM

## 2024-10-29 LAB
GLUCOSE BLD STRIP.AUTO-MCNC: 204 MG/DL (ref 65–117)
GLUCOSE BLD STRIP.AUTO-MCNC: 231 MG/DL (ref 65–117)
SERVICE CMNT-IMP: ABNORMAL
SERVICE CMNT-IMP: ABNORMAL

## 2024-10-29 PROCEDURE — 3600000012 HC SURGERY LEVEL 2 ADDTL 15MIN: Performed by: SURGERY

## 2024-10-29 PROCEDURE — 7100000000 HC PACU RECOVERY - FIRST 15 MIN: Performed by: SURGERY

## 2024-10-29 PROCEDURE — 3700000001 HC ADD 15 MINUTES (ANESTHESIA): Performed by: SURGERY

## 2024-10-29 PROCEDURE — C9290 INJ, BUPIVACAINE LIPOSOME: HCPCS | Performed by: SURGERY

## 2024-10-29 PROCEDURE — 6360000002 HC RX W HCPCS: Performed by: NURSE ANESTHETIST, CERTIFIED REGISTERED

## 2024-10-29 PROCEDURE — 2580000003 HC RX 258: Performed by: ANESTHESIOLOGY

## 2024-10-29 PROCEDURE — 6360000002 HC RX W HCPCS: Performed by: SURGERY

## 2024-10-29 PROCEDURE — 7100000001 HC PACU RECOVERY - ADDTL 15 MIN: Performed by: SURGERY

## 2024-10-29 PROCEDURE — 3700000000 HC ANESTHESIA ATTENDED CARE: Performed by: SURGERY

## 2024-10-29 PROCEDURE — 2709999900 HC NON-CHARGEABLE SUPPLY: Performed by: SURGERY

## 2024-10-29 PROCEDURE — 2500000003 HC RX 250 WO HCPCS: Performed by: NURSE ANESTHETIST, CERTIFIED REGISTERED

## 2024-10-29 PROCEDURE — 3600000002 HC SURGERY LEVEL 2 BASE: Performed by: SURGERY

## 2024-10-29 PROCEDURE — 82962 GLUCOSE BLOOD TEST: CPT

## 2024-10-29 PROCEDURE — 88304 TISSUE EXAM BY PATHOLOGIST: CPT

## 2024-10-29 PROCEDURE — 2720000010 HC SURG SUPPLY STERILE: Performed by: SURGERY

## 2024-10-29 RX ORDER — FENTANYL CITRATE 50 UG/ML
25 INJECTION, SOLUTION INTRAMUSCULAR; INTRAVENOUS EVERY 5 MIN PRN
Status: DISCONTINUED | OUTPATIENT
Start: 2024-10-29 | End: 2024-10-29 | Stop reason: HOSPADM

## 2024-10-29 RX ORDER — LIDOCAINE HYDROCHLORIDE 20 MG/ML
INJECTION, SOLUTION EPIDURAL; INFILTRATION; INTRACAUDAL; PERINEURAL
Status: DISCONTINUED | OUTPATIENT
Start: 2024-10-29 | End: 2024-10-29 | Stop reason: SDUPTHER

## 2024-10-29 RX ORDER — OXYCODONE HYDROCHLORIDE 5 MG/1
5 TABLET ORAL
Status: DISCONTINUED | OUTPATIENT
Start: 2024-10-29 | End: 2024-10-29 | Stop reason: HOSPADM

## 2024-10-29 RX ORDER — SODIUM CHLORIDE 0.9 % (FLUSH) 0.9 %
5-40 SYRINGE (ML) INJECTION EVERY 12 HOURS SCHEDULED
Status: DISCONTINUED | OUTPATIENT
Start: 2024-10-29 | End: 2024-10-29 | Stop reason: HOSPADM

## 2024-10-29 RX ORDER — ROCURONIUM BROMIDE 10 MG/ML
INJECTION, SOLUTION INTRAVENOUS
Status: DISCONTINUED | OUTPATIENT
Start: 2024-10-29 | End: 2024-10-29 | Stop reason: SDUPTHER

## 2024-10-29 RX ORDER — HYDROMORPHONE HYDROCHLORIDE 1 MG/ML
0.5 INJECTION, SOLUTION INTRAMUSCULAR; INTRAVENOUS; SUBCUTANEOUS EVERY 5 MIN PRN
Status: DISCONTINUED | OUTPATIENT
Start: 2024-10-29 | End: 2024-10-29 | Stop reason: HOSPADM

## 2024-10-29 RX ORDER — OXYCODONE HYDROCHLORIDE 5 MG/1
5 TABLET ORAL EVERY 6 HOURS PRN
Qty: 40 TABLET | Refills: 0 | Status: SHIPPED | OUTPATIENT
Start: 2024-10-29 | End: 2024-11-08

## 2024-10-29 RX ORDER — SODIUM CHLORIDE 0.9 % (FLUSH) 0.9 %
5-40 SYRINGE (ML) INJECTION PRN
Status: DISCONTINUED | OUTPATIENT
Start: 2024-10-29 | End: 2024-10-29 | Stop reason: HOSPADM

## 2024-10-29 RX ORDER — DEXAMETHASONE SODIUM PHOSPHATE 4 MG/ML
INJECTION, SOLUTION INTRA-ARTICULAR; INTRALESIONAL; INTRAMUSCULAR; INTRAVENOUS; SOFT TISSUE
Status: DISCONTINUED | OUTPATIENT
Start: 2024-10-29 | End: 2024-10-29 | Stop reason: SDUPTHER

## 2024-10-29 RX ORDER — PROPOFOL 10 MG/ML
INJECTION, EMULSION INTRAVENOUS
Status: DISCONTINUED | OUTPATIENT
Start: 2024-10-29 | End: 2024-10-29 | Stop reason: SDUPTHER

## 2024-10-29 RX ORDER — BUPIVACAINE HYDROCHLORIDE 2.5 MG/ML
INJECTION, SOLUTION EPIDURAL; INFILTRATION; INTRACAUDAL PRN
Status: DISCONTINUED | OUTPATIENT
Start: 2024-10-29 | End: 2024-10-29 | Stop reason: ALTCHOICE

## 2024-10-29 RX ORDER — ONDANSETRON 2 MG/ML
INJECTION INTRAMUSCULAR; INTRAVENOUS
Status: DISCONTINUED | OUTPATIENT
Start: 2024-10-29 | End: 2024-10-29 | Stop reason: SDUPTHER

## 2024-10-29 RX ORDER — SODIUM CHLORIDE, SODIUM LACTATE, POTASSIUM CHLORIDE, CALCIUM CHLORIDE 600; 310; 30; 20 MG/100ML; MG/100ML; MG/100ML; MG/100ML
INJECTION, SOLUTION INTRAVENOUS CONTINUOUS
Status: DISCONTINUED | OUTPATIENT
Start: 2024-10-29 | End: 2024-10-29 | Stop reason: HOSPADM

## 2024-10-29 RX ORDER — NALOXONE HYDROCHLORIDE 0.4 MG/ML
INJECTION, SOLUTION INTRAMUSCULAR; INTRAVENOUS; SUBCUTANEOUS PRN
Status: DISCONTINUED | OUTPATIENT
Start: 2024-10-29 | End: 2024-10-29 | Stop reason: HOSPADM

## 2024-10-29 RX ORDER — ONDANSETRON 2 MG/ML
4 INJECTION INTRAMUSCULAR; INTRAVENOUS
Status: DISCONTINUED | OUTPATIENT
Start: 2024-10-29 | End: 2024-10-29 | Stop reason: HOSPADM

## 2024-10-29 RX ORDER — FENTANYL CITRATE 50 UG/ML
INJECTION, SOLUTION INTRAMUSCULAR; INTRAVENOUS
Status: DISCONTINUED | OUTPATIENT
Start: 2024-10-29 | End: 2024-10-29 | Stop reason: SDUPTHER

## 2024-10-29 RX ADMIN — ROCURONIUM BROMIDE 40 MG: 10 INJECTION INTRAVENOUS at 09:41

## 2024-10-29 RX ADMIN — LIDOCAINE HYDROCHLORIDE 100 MG: 20 INJECTION, SOLUTION EPIDURAL; INFILTRATION; INTRACAUDAL; PERINEURAL at 09:27

## 2024-10-29 RX ADMIN — HYDROMORPHONE HYDROCHLORIDE 1 MG: 1 INJECTION, SOLUTION INTRAMUSCULAR; INTRAVENOUS; SUBCUTANEOUS at 09:50

## 2024-10-29 RX ADMIN — FENTANYL CITRATE 50 MCG: 50 INJECTION, SOLUTION INTRAMUSCULAR; INTRAVENOUS at 09:39

## 2024-10-29 RX ADMIN — SUGAMMADEX 200 MG: 100 INJECTION, SOLUTION INTRAVENOUS at 10:15

## 2024-10-29 RX ADMIN — ROCURONIUM BROMIDE 10 MG: 10 INJECTION INTRAVENOUS at 09:27

## 2024-10-29 RX ADMIN — SODIUM CHLORIDE, POTASSIUM CHLORIDE, SODIUM LACTATE AND CALCIUM CHLORIDE: 600; 310; 30; 20 INJECTION, SOLUTION INTRAVENOUS at 08:52

## 2024-10-29 RX ADMIN — DEXAMETHASONE SODIUM PHOSPHATE 4 MG: 4 INJECTION INTRA-ARTICULAR; INTRALESIONAL; INTRAMUSCULAR; INTRAVENOUS; SOFT TISSUE at 09:49

## 2024-10-29 RX ADMIN — FENTANYL CITRATE 50 MCG: 50 INJECTION, SOLUTION INTRAMUSCULAR; INTRAVENOUS at 09:27

## 2024-10-29 RX ADMIN — ONDANSETRON 4 MG: 2 INJECTION INTRAMUSCULAR; INTRAVENOUS at 10:15

## 2024-10-29 RX ADMIN — PROPOFOL 120 MG: 10 INJECTION, EMULSION INTRAVENOUS at 09:27

## 2024-10-29 ASSESSMENT — PAIN - FUNCTIONAL ASSESSMENT
PAIN_FUNCTIONAL_ASSESSMENT: 0-10
PAIN_FUNCTIONAL_ASSESSMENT: 0-10

## 2024-10-29 NOTE — OP NOTE
Operative Report    Patient: Carrillo Colunga MRN: [id number]  SSN: [id number]    YOB: 1945  Age: 79 y.o.  Sex: male       Date of Surgery: 10/29/24    Preoperative Diagnosis: Internal/external hemorrhoids with mucosal prolapse     Postoperative Diagnosis: SAME     Surgeons and Role:     * Kai Hamm II, MD - Primary    Anesthesia: General     Procedure: TWO COLUMN HEMORRHOIDECTOMY with Mucosectomy    Procedure in Detail:   The patient was taken to the operating suite and placed in the supine position.  General endotracheal anesthesia was induced.  The patient was then placed in the prone jackknife position.  The buttocks were taped apart and the perianal area was prepped and draped in the usual sterile fashion.  A timeout was performed indicating the correct patient and procedure as per hospital protocol.    A Fansler anoscope was placed in the anal canal and the entire anal canal and distal rectum were inspected in its entirety.  The patient was noted to have 2 enlarged mixed internal/external hemorrhoids with significant mucosal prolapse.  These were primarily posteriorly in the right posterior and left lateral positions.  Both hemorrhoids were removed in a similar fashion.  An elliptical incision was made starting along the anal margin and extending down to the base of the hemorrhoid in the anal canal.  The hemorrhoid and redundant mucosa were carefully dissected away from the surrounding external and internal sphincter muscles in an avascular plane.  This was performed using a handheld Ligasure device.  Care was taken to avoid injury to the surrounding muscles.  The base of the hemorrhoid was divided and hemostasis was achieved using a combination of electrocautery and 2-0 Chromic suture.  A 2-0 Chromic was used to approximate the anorectal mucosa in a running fashion.   30ml of 0.25% Marcaine was injected subcutaneously along the dissection plane.  266mg of Exparel was also injected along

## 2024-10-29 NOTE — DISCHARGE INSTRUCTIONS
Honey Anthony MD, FACS  MD Gary Ceballos MD Peter Miller, MD Joseph Coury, MD Kathryn Chuquin, MD    [facility]         Discharge Instructions for Ambulatory 23-Hour Surgery Patients    Advance to high fiber diet as tolerated.  Take Metamucil/Citrucel 1 teaspoon mixed in a glass of water in AM and PM.  Remove dressing at 8pm.  Take 1 sitz baths today (warm water baths for 15-20 minutes). Begin four (4) times a day the day after surgery.  Apply Lidocaine Ointment (does not need a prescription) to the anal area after sitz baths, place a dry 4x4 gauze over the are between the buttocks.  Take pain medication as prescribed. (NO DRIVING WHILE ON PAIN MEDICATION).  No lifting any objects weighing more than 15 pounds.  No sitting more than 45 minutes without standing, walking, or lying down.  You may walk as desired.   Please schedule an appointment in the office within 4 weeks. Call ahead to schedule your appointment [phone].   Call Exchange [phone] if you have any problems or questions after hours.   Expect slight bright red blood up to four (4) weeks from surgery.   Stitches are the dissolving type - they do not need removal in the office.   If no bowel movement by tomorrow morning, take 1 capful of Miralax or 1 tablespoon of Milk of Magnesia. Repeat if no results. If still no bowel movement the next day, then call the office.                [address], [address]  [place], [place] [address] [phone] · Fax [phone]    [address] [place] [id number] [phone] · Fax [phone]    [address] [place] [address], [address]  [place], [place] [address] [phone] · Fax [phone]      DISCHARGE SUMMARY from Nurse    PATIENT INSTRUCTIONS:    After general anesthesia or intravenous sedation, for 24 hours or while taking prescription narcotics:    Have someone responsible help you with your care  Limit your

## 2024-10-29 NOTE — ANESTHESIA PRE PROCEDURE
Counseling given: Not Answered      Vital Signs (Current): There were no vitals filed for this visit.                                           BP Readings from Last 3 Encounters:   04/25/24 (!) 167/90   01/11/24 134/60   04/21/23 138/76       NPO Status:                                                                                 BMI:   Wt Readings from Last 3 Encounters:   04/25/24 84.7 kg (186 lb 12.8 oz)   01/11/24 84.9 kg (187 lb 3.2 oz)   04/21/23 86 kg (189 lb 9.6 oz)     There is no height or weight on file to calculate BMI.    CBC:   Lab Results   Component Value Date/Time    WBC 7.3 04/25/2024 03:28 PM    RBC 5.18 04/25/2024 03:28 PM    HGB 15.6 04/25/2024 03:28 PM    HCT 45.6 04/25/2024 03:28 PM    MCV 88.0 04/25/2024 03:28 PM    RDW 13.9 04/25/2024 03:28 PM     04/25/2024 03:28 PM       CMP:   Lab Results   Component Value Date/Time     01/08/2024 12:33 PM    K 4.3 01/08/2024 12:33 PM     01/08/2024 12:33 PM    CO2 23 01/08/2024 12:33 PM    BUN 11 01/08/2024 12:33 PM    CREATININE 0.90 01/08/2024 12:33 PM    GFRAA 95 01/06/2022 11:11 AM    AGRATIO 1.9 01/08/2024 12:33 PM    LABGLOM 88 01/08/2024 12:33 PM    GLUCOSE 114 01/08/2024 12:33 PM    CALCIUM 9.0 01/08/2024 12:33 PM    BILITOT 0.6 01/08/2024 12:33 PM    ALKPHOS 158 01/08/2024 12:33 PM    AST 13 01/08/2024 12:33 PM    ALT 14 01/08/2024 12:33 PM       POC Tests: No results for input(s): \"POCGLU\", \"POCNA\", \"POCK\", \"POCCL\", \"POCBUN\", \"POCHEMO\", \"POCHCT\" in the last 72 hours.    Coags: No results found for: \"PROTIME\", \"INR\", \"APTT\"    HCG (If Applicable): No results found for: \"PREGTESTUR\", \"PREGSERUM\", \"HCG\", \"HCGQUANT\"     ABGs: No results found for: \"PHART\", \"PO2ART\", \"NCT2LAB\", \"OHL7XAX\", \"BEART\", \"R2YJGCOJ\"     Type & Screen (If Applicable):  No results found for: \"ABORH\", \"LABANTI\"    Drug/Infectious Status (If Applicable):  Lab Results   Component Value Date/Time    HEPCAB <0.1 09/27/2017 12:04 PM

## 2024-10-30 NOTE — ANESTHESIA POSTPROCEDURE EVALUATION
Department of Anesthesiology  Postprocedure Note    Patient: Carrillo Colunga  MRN: [id number]  YOB: 1945  Date of evaluation: 10/30/2024    Procedure Summary       Date: 10/29/24 Room / Location: [facility] MAIN OR  / [facility] MAIN OR    Anesthesia Start: 0924 Anesthesia Stop: 1035    Procedure: THREE COLUMN HEMORRHOIDECTOMY (Rectum) Diagnosis:       External hemorrhoids      (External hemorrhoids [K64.4])    Providers: Kai Hamm II, MD Responsible Provider: Walt Jc MD    Anesthesia Type: General ASA Status: 3            Anesthesia Type: General    Felipe Phase I: Felipe Score: 10    Felipe Phase II:      Anesthesia Post Evaluation    Patient location during evaluation: PACU  Patient participation: complete - patient participated  Level of consciousness: sleepy but conscious  Airway patency: patent  Nausea & Vomiting: no nausea and no vomiting  Cardiovascular status: hemodynamically stable  Respiratory status: acceptable and nasal cannula  Hydration status: stable  Multimodal analgesia pain management approach        No notable events documented.

## 2024-12-28 DIAGNOSIS — E11.21 TYPE 2 DIABETES MELLITUS WITH DIABETIC NEPHROPATHY, WITHOUT LONG-TERM CURRENT USE OF INSULIN (HCC): ICD-10-CM

## 2024-12-28 DIAGNOSIS — I10 ESSENTIAL (PRIMARY) HYPERTENSION: ICD-10-CM

## 2024-12-28 DIAGNOSIS — E78.5 HYPERLIPIDEMIA LDL GOAL <100: ICD-10-CM

## 2025-01-04 RX ORDER — GLIPIZIDE 5 MG/1
TABLET ORAL
Qty: 270 TABLET | Refills: 3 | Status: SHIPPED | OUTPATIENT
Start: 2025-01-04

## 2025-01-07 ENCOUNTER — TELEPHONE (OUTPATIENT)
Age: 80
End: 2025-01-07

## 2025-01-07 NOTE — TELEPHONE ENCOUNTER
Patient's wife called and left a voicemail at 1pm stating that someone from our office called but I don't know who this may have been and I was not trying to reach him.  I saw his appointment was moved from 1/10/25 to 1/28/25 so I don't know if there was possibly a call regarding this change or if one of our schedulers was returning their call but if they don't have any other questions at this time then I will see him on 1/28/25 at 11:50am as scheduled and he should have his labs drawn before this visit.  Thanks!

## 2025-01-08 NOTE — TELEPHONE ENCOUNTER
LVM on both pt's phone and his wife informing them of message below per Dr Bynum. Call back number left.

## 2025-01-16 LAB
ALBUMIN SERPL-MCNC: 4.7 G/DL (ref 3.8–4.8)
ALBUMIN/CREAT UR: 61 MG/G CREAT (ref 0–29)
ALP SERPL-CCNC: 159 IU/L (ref 44–121)
ALT SERPL-CCNC: 13 IU/L (ref 0–44)
AST SERPL-CCNC: 10 IU/L (ref 0–40)
BILIRUB SERPL-MCNC: 0.7 MG/DL (ref 0–1.2)
BUN SERPL-MCNC: 9 MG/DL (ref 8–27)
BUN/CREAT SERPL: 11 (ref 10–24)
CALCIUM SERPL-MCNC: 9.3 MG/DL (ref 8.6–10.2)
CHLORIDE SERPL-SCNC: 105 MMOL/L (ref 96–106)
CHOLEST SERPL-MCNC: 150 MG/DL (ref 100–199)
CO2 SERPL-SCNC: 23 MMOL/L (ref 20–29)
CREAT SERPL-MCNC: 0.84 MG/DL (ref 0.76–1.27)
CREAT UR-MCNC: 45.2 MG/DL
EGFRCR SERPLBLD CKD-EPI 2021: 89 ML/MIN/1.73
GLOBULIN SER CALC-MCNC: 2.7 G/DL (ref 1.5–4.5)
GLUCOSE SERPL-MCNC: 191 MG/DL (ref 70–99)
HBA1C MFR BLD: 8 % (ref 4.8–5.6)
HDLC SERPL-MCNC: 38 MG/DL
IMP & REVIEW OF LAB RESULTS: NORMAL
LDLC SERPL CALC-MCNC: 93 MG/DL (ref 0–99)
Lab: NORMAL
MICROALBUMIN UR-MCNC: 27.7 UG/ML
POTASSIUM SERPL-SCNC: 4.5 MMOL/L (ref 3.5–5.2)
PROT SERPL-MCNC: 7.4 G/DL (ref 6–8.5)
SODIUM SERPL-SCNC: 145 MMOL/L (ref 134–144)
TRIGL SERPL-MCNC: 103 MG/DL (ref 0–149)
VLDLC SERPL CALC-MCNC: 19 MG/DL (ref 5–40)

## (undated) DEVICE — SEALER LAP SM L18.8CM OPN JAW HAND/FOOT SWCH FORCETRIAD

## (undated) DEVICE — SPONGE GZ W4XL4IN COT 12 PLY TYP VII WVN C FLD DSGN STERILE

## (undated) DEVICE — SOLUTION SCRB 2OZ 10% POVIDONE IOD ANTIMIC BTL

## (undated) DEVICE — GLOVE ORANGE PI 7   MSG9070

## (undated) DEVICE — KIT,1200CC CANISTER,3/16"X6' TUBING: Brand: MEDLINE INDUSTRIES, INC.

## (undated) DEVICE — JELLY,LUBE,STERILE,FLIP TOP,TUBE,4-OZ: Brand: MEDLINE

## (undated) DEVICE — SUTURE CHROMIC GUT SZ 2-0 L27IN ABSRB BRN L26MM SH 1/2 CIR G123H

## (undated) DEVICE — YANKAUER,POOLE TIP,STERILE,50/CS: Brand: MEDLINE

## (undated) DEVICE — SUTURE CHROMIC GUT SZ 4-0 L27IN ABSRB BRN L26MM SH 1/2 CIR G121H

## (undated) DEVICE — SYRINGE MED 10ML LUERLOCK TIP W/O SFTY DISP

## (undated) DEVICE — MAJOR LAPAROTOMY-MRMC: Brand: MEDLINE INDUSTRIES, INC.

## (undated) DEVICE — GLOVE SURG SZ 75 L12IN FNGR THK79MIL GRN LTX FREE

## (undated) DEVICE — HYPODERMIC SAFETY NEEDLE: Brand: MONOJECT

## (undated) DEVICE — MASTISOL ADHESIVE LIQ 2/3ML

## (undated) DEVICE — SUTURE PERMAHAND SZ 2-0 L30IN NONABSORBABLE BLK SILK W/O A305H